# Patient Record
Sex: MALE | Race: WHITE | NOT HISPANIC OR LATINO | Employment: FULL TIME | URBAN - METROPOLITAN AREA
[De-identification: names, ages, dates, MRNs, and addresses within clinical notes are randomized per-mention and may not be internally consistent; named-entity substitution may affect disease eponyms.]

---

## 2018-06-18 RX ORDER — ALBUTEROL SULFATE 90 UG/1
AEROSOL, METERED RESPIRATORY (INHALATION)
Qty: 9 G | Refills: 0 | Status: SHIPPED | OUTPATIENT
Start: 2018-06-18 | End: 2019-03-14 | Stop reason: SDUPTHER

## 2019-03-18 RX ORDER — ALBUTEROL SULFATE 90 UG/1
AEROSOL, METERED RESPIRATORY (INHALATION)
Qty: 9 G | Refills: 0 | Status: SHIPPED | OUTPATIENT
Start: 2019-03-18 | End: 2019-04-10 | Stop reason: SDUPTHER

## 2019-04-10 DIAGNOSIS — J45.909 ASTHMA, UNSPECIFIED ASTHMA SEVERITY, UNSPECIFIED WHETHER COMPLICATED, UNSPECIFIED WHETHER PERSISTENT: Primary | ICD-10-CM

## 2019-04-10 RX ORDER — ALBUTEROL SULFATE 90 UG/1
1 AEROSOL, METERED RESPIRATORY (INHALATION) EVERY 6 HOURS PRN
Qty: 9 G | Refills: 0 | Status: SHIPPED | OUTPATIENT
Start: 2019-04-10 | End: 2021-06-01

## 2019-10-09 ENCOUNTER — OFFICE VISIT (OUTPATIENT)
Dept: ALLERGY | Facility: CLINIC | Age: 59
End: 2019-10-09
Payer: COMMERCIAL

## 2019-10-09 VITALS
HEIGHT: 72 IN | BODY MASS INDEX: 30.75 KG/M2 | WEIGHT: 227.06 LBS | DIASTOLIC BLOOD PRESSURE: 88 MMHG | SYSTOLIC BLOOD PRESSURE: 136 MMHG

## 2019-10-09 DIAGNOSIS — R05.9 COUGH: Primary | ICD-10-CM

## 2019-10-09 PROCEDURE — 99999 PR PBB SHADOW E&M-EST. PATIENT-LVL III: ICD-10-PCS | Mod: PBBFAC,,, | Performed by: ALLERGY & IMMUNOLOGY

## 2019-10-09 PROCEDURE — 99204 OFFICE O/P NEW MOD 45 MIN: CPT | Mod: S$GLB,,, | Performed by: ALLERGY & IMMUNOLOGY

## 2019-10-09 PROCEDURE — 99204 PR OFFICE/OUTPT VISIT, NEW, LEVL IV, 45-59 MIN: ICD-10-PCS | Mod: S$GLB,,, | Performed by: ALLERGY & IMMUNOLOGY

## 2019-10-09 PROCEDURE — 3008F PR BODY MASS INDEX (BMI) DOCUMENTED: ICD-10-PCS | Mod: CPTII,S$GLB,, | Performed by: ALLERGY & IMMUNOLOGY

## 2019-10-09 PROCEDURE — 3008F BODY MASS INDEX DOCD: CPT | Mod: CPTII,S$GLB,, | Performed by: ALLERGY & IMMUNOLOGY

## 2019-10-09 PROCEDURE — 99999 PR PBB SHADOW E&M-EST. PATIENT-LVL III: CPT | Mod: PBBFAC,,, | Performed by: ALLERGY & IMMUNOLOGY

## 2019-10-09 RX ORDER — BUDESONIDE AND FORMOTEROL FUMARATE DIHYDRATE 80; 4.5 UG/1; UG/1
2 AEROSOL RESPIRATORY (INHALATION) 2 TIMES DAILY
Qty: 1 INHALER | Refills: 6 | Status: SHIPPED | OUTPATIENT
Start: 2019-10-09 | End: 2020-11-18

## 2019-10-09 RX ORDER — ALBUTEROL SULFATE 90 UG/1
AEROSOL, METERED RESPIRATORY (INHALATION)
COMMUNITY
Start: 2019-03-13 | End: 2021-05-11

## 2019-10-09 RX ORDER — CETIRIZINE HYDROCHLORIDE 10 MG/1
10 TABLET ORAL DAILY
COMMUNITY
End: 2023-05-24

## 2019-10-09 NOTE — PROGRESS NOTES
Subjective:       Patient ID: Jerrell Bay IV is a 59 y.o. male.    Chief Complaint:  Nasal Congestion; Cough; and Wheezing (at night)      HPI  Pt w hx seasonal allergic rhinitis, usu worse in fall and spring, presents w main concern of chronic nocturnal cough over last approx 6 months.  Almost every night he wakes from sleep between 3 and 4 am w cough, shortness of breath. Symptoms are relieved with albuterol use. If he doesn't use albuterol, he finds it hard to go back to sleep. During this time has never had daytime wheeze. Denies wheeze w exertion, exercise. He exercises routinely. Doesn't appreciate any decrease in exercise tolerance. Hasn't been limiting his activity b/c of cough.  Denies overt GERD sx's    He recalls distant hx of asthma during college years. Recalls that he was allergy tested but not certain of results.  Recall he was on likely daily ICS for some time around then, but not since. Until the last 6 months he reports it had been several years since he needed albuterol. Denies any need for systemic steroids for wheeze over last several years.    Reports that rhinitis sx's--usu sneezing, cough, nasal congestion--are generally well-controlled w daily zyrtec during spring and fall, as well as prn flonase.    Environmental History: Pets in the home: dogs (2).  [place]: hardwood floors  Tobacco Smoke in Home: no    Past Medical History:   Diagnosis Date    Asthma        Family History   Problem Relation Age of Onset    Allergies Father          Review of Systems   Constitutional: Negative for activity change, fatigue and fever.   HENT: Negative for congestion, postnasal drip, rhinorrhea, sinus pressure and sneezing.    Eyes: Negative for discharge, redness and itching.   Respiratory: Negative for cough, shortness of breath and wheezing.    Cardiovascular: Negative for chest pain.   Gastrointestinal: Negative for constipation, diarrhea, nausea and vomiting.   Genitourinary: Negative for  difficulty urinating.   Musculoskeletal: Negative for joint swelling and myalgias.   Skin: Negative for rash.   Neurological: Negative for headaches.   Hematological: Does not bruise/bleed easily.   Psychiatric/Behavioral: Negative for behavioral problems and sleep disturbance.        Objective:   Physical Exam   Constitutional: He is oriented to person, place, and time. He appears well-developed and well-nourished. No distress.   HENT:   Head: Normocephalic and atraumatic.   Right Ear: Tympanic membrane and external ear normal.   Left Ear: Tympanic membrane and external ear normal.   Nose: Nose normal.   Mouth/Throat: Oropharynx is clear and moist. No oropharyngeal exudate.   Eyes: Conjunctivae are normal. Right eye exhibits no discharge. Left eye exhibits no discharge.   Neck: Normal range of motion. Neck supple. No thyromegaly present.   Cardiovascular: Normal rate and regular rhythm.   Pulmonary/Chest: Effort normal and breath sounds normal. No respiratory distress. He has no wheezes.   Abdominal: Soft. Bowel sounds are normal. He exhibits no distension. There is no tenderness.   Musculoskeletal: Normal range of motion. He exhibits no edema.   Lymphadenopathy:     He has no cervical adenopathy.   Neurological: He is alert and oriented to person, place, and time. He exhibits normal muscle tone.   Skin: Skin is warm. No rash noted. He is not diaphoretic. No erythema.   Psychiatric: He has a normal mood and affect. His behavior is normal. Judgment and thought content normal.         Assessment:       1. Cough     Consider asthma     Plan:       Jerrell was seen today for nasal congestion, cough and wheezing.    Diagnoses and all orders for this visit:    Cough    Other orders  -     budesonide-formoterol 80-4.5 mcg (SYMBICORT) 80-4.5 mcg/actuation HFAA; Inhale 2 puffs into the lungs 2 (two) times daily. Rinse mouth after use    albuterol prn    discussed poss spirometry, allergy testing. Will reconsider if no  improvement in 3-4 weeks    If responds well, consider wean to daily ICS in ~2 months.

## 2021-04-16 ENCOUNTER — PATIENT MESSAGE (OUTPATIENT)
Dept: RESEARCH | Facility: HOSPITAL | Age: 61
End: 2021-04-16

## 2021-05-11 ENCOUNTER — TELEPHONE (OUTPATIENT)
Dept: UROLOGY | Facility: CLINIC | Age: 61
End: 2021-05-11

## 2021-05-11 ENCOUNTER — OFFICE VISIT (OUTPATIENT)
Dept: UROLOGY | Facility: CLINIC | Age: 61
End: 2021-05-11
Payer: COMMERCIAL

## 2021-05-11 VITALS
HEIGHT: 72 IN | BODY MASS INDEX: 30.75 KG/M2 | WEIGHT: 227.06 LBS | DIASTOLIC BLOOD PRESSURE: 98 MMHG | SYSTOLIC BLOOD PRESSURE: 189 MMHG | HEART RATE: 69 BPM

## 2021-05-11 DIAGNOSIS — N40.0 ENLARGED PROSTATE: Primary | ICD-10-CM

## 2021-05-11 DIAGNOSIS — R97.20 ELEVATED PSA: ICD-10-CM

## 2021-05-11 PROCEDURE — 1126F AMNT PAIN NOTED NONE PRSNT: CPT | Mod: S$GLB,,, | Performed by: UROLOGY

## 2021-05-11 PROCEDURE — 99204 OFFICE O/P NEW MOD 45 MIN: CPT | Mod: S$GLB,,, | Performed by: UROLOGY

## 2021-05-11 PROCEDURE — 99204 PR OFFICE/OUTPT VISIT, NEW, LEVL IV, 45-59 MIN: ICD-10-PCS | Mod: S$GLB,,, | Performed by: UROLOGY

## 2021-05-11 PROCEDURE — 3008F BODY MASS INDEX DOCD: CPT | Mod: CPTII,S$GLB,, | Performed by: UROLOGY

## 2021-05-11 PROCEDURE — 1126F PR PAIN SEVERITY QUANTIFIED, NO PAIN PRESENT: ICD-10-PCS | Mod: S$GLB,,, | Performed by: UROLOGY

## 2021-05-11 PROCEDURE — 87086 URINE CULTURE/COLONY COUNT: CPT | Performed by: UROLOGY

## 2021-05-11 PROCEDURE — 3008F PR BODY MASS INDEX (BMI) DOCUMENTED: ICD-10-PCS | Mod: CPTII,S$GLB,, | Performed by: UROLOGY

## 2021-05-11 RX ORDER — MELOXICAM 15 MG/1
TABLET ORAL
COMMUNITY
End: 2023-05-24

## 2021-05-11 RX ORDER — SULFAMETHOXAZOLE AND TRIMETHOPRIM 400; 80 MG/1; MG/1
1 TABLET ORAL 2 TIMES DAILY
Qty: 56 TABLET | Refills: 0 | Status: SHIPPED | OUTPATIENT
Start: 2021-05-11 | End: 2021-06-08

## 2021-05-11 RX ORDER — IRBESARTAN 150 MG/1
TABLET ORAL
COMMUNITY
Start: 2021-03-31 | End: 2021-06-01

## 2021-05-12 LAB — BACTERIA UR CULT: NO GROWTH

## 2021-06-01 ENCOUNTER — OFFICE VISIT (OUTPATIENT)
Dept: INTERNAL MEDICINE | Facility: CLINIC | Age: 61
End: 2021-06-01
Attending: INTERNAL MEDICINE
Payer: COMMERCIAL

## 2021-06-01 VITALS
SYSTOLIC BLOOD PRESSURE: 158 MMHG | BODY MASS INDEX: 29.26 KG/M2 | HEART RATE: 72 BPM | OXYGEN SATURATION: 98 % | HEIGHT: 72 IN | WEIGHT: 216 LBS | DIASTOLIC BLOOD PRESSURE: 86 MMHG

## 2021-06-01 DIAGNOSIS — E78.00 HIGH CHOLESTEROL: ICD-10-CM

## 2021-06-01 DIAGNOSIS — J45.20 MILD INTERMITTENT ASTHMA WITHOUT COMPLICATION: ICD-10-CM

## 2021-06-01 DIAGNOSIS — I10 ESSENTIAL HYPERTENSION: ICD-10-CM

## 2021-06-01 DIAGNOSIS — R97.20 ELEVATED PSA: Primary | ICD-10-CM

## 2021-06-01 DIAGNOSIS — M19.011 PRIMARY OSTEOARTHRITIS OF RIGHT SHOULDER: ICD-10-CM

## 2021-06-01 PROCEDURE — 3008F BODY MASS INDEX DOCD: CPT | Mod: CPTII,S$GLB,, | Performed by: INTERNAL MEDICINE

## 2021-06-01 PROCEDURE — 3077F SYST BP >= 140 MM HG: CPT | Mod: CPTII,S$GLB,, | Performed by: INTERNAL MEDICINE

## 2021-06-01 PROCEDURE — 3008F PR BODY MASS INDEX (BMI) DOCUMENTED: ICD-10-PCS | Mod: CPTII,S$GLB,, | Performed by: INTERNAL MEDICINE

## 2021-06-01 PROCEDURE — 3079F PR MOST RECENT DIASTOLIC BLOOD PRESSURE 80-89 MM HG: ICD-10-PCS | Mod: CPTII,S$GLB,, | Performed by: INTERNAL MEDICINE

## 2021-06-01 PROCEDURE — 99386 PR PREVENTIVE VISIT,NEW,40-64: ICD-10-PCS | Mod: S$GLB,,, | Performed by: INTERNAL MEDICINE

## 2021-06-01 PROCEDURE — 3079F DIAST BP 80-89 MM HG: CPT | Mod: CPTII,S$GLB,, | Performed by: INTERNAL MEDICINE

## 2021-06-01 PROCEDURE — 3077F PR MOST RECENT SYSTOLIC BLOOD PRESSURE >= 140 MM HG: ICD-10-PCS | Mod: CPTII,S$GLB,, | Performed by: INTERNAL MEDICINE

## 2021-06-01 PROCEDURE — 99386 PREV VISIT NEW AGE 40-64: CPT | Mod: S$GLB,,, | Performed by: INTERNAL MEDICINE

## 2021-06-01 RX ORDER — IRBESARTAN 300 MG/1
300 TABLET ORAL NIGHTLY
Qty: 90 TABLET | Refills: 3 | Status: SHIPPED | OUTPATIENT
Start: 2021-06-01 | End: 2022-03-14

## 2021-06-03 PROBLEM — J45.20 MILD INTERMITTENT ASTHMA WITHOUT COMPLICATION: Status: ACTIVE | Noted: 2021-06-03

## 2021-06-03 PROBLEM — M19.011 DJD OF RIGHT SHOULDER: Status: ACTIVE | Noted: 2021-06-03

## 2021-06-03 PROBLEM — E78.00 HIGH CHOLESTEROL: Status: ACTIVE | Noted: 2021-06-03

## 2021-06-14 ENCOUNTER — LAB VISIT (OUTPATIENT)
Dept: LAB | Facility: HOSPITAL | Age: 61
End: 2021-06-14
Attending: UROLOGY
Payer: COMMERCIAL

## 2021-06-14 DIAGNOSIS — N40.0 ENLARGED PROSTATE: ICD-10-CM

## 2021-06-14 DIAGNOSIS — R97.20 ELEVATED PSA: ICD-10-CM

## 2021-06-14 PROCEDURE — 84153 ASSAY OF PSA TOTAL: CPT | Performed by: UROLOGY

## 2021-06-14 PROCEDURE — 36415 COLL VENOUS BLD VENIPUNCTURE: CPT | Mod: PO | Performed by: UROLOGY

## 2021-06-15 LAB — COMPLEXED PSA SERPL-MCNC: 6.1 NG/ML (ref 0–4)

## 2021-06-18 ENCOUNTER — OFFICE VISIT (OUTPATIENT)
Dept: UROLOGY | Facility: CLINIC | Age: 61
End: 2021-06-18
Payer: COMMERCIAL

## 2021-06-18 VITALS
HEART RATE: 73 BPM | SYSTOLIC BLOOD PRESSURE: 175 MMHG | WEIGHT: 216 LBS | HEIGHT: 72 IN | DIASTOLIC BLOOD PRESSURE: 89 MMHG | BODY MASS INDEX: 29.26 KG/M2

## 2021-06-18 DIAGNOSIS — R97.20 ELEVATED PSA: ICD-10-CM

## 2021-06-18 DIAGNOSIS — N40.0 ENLARGED PROSTATE: Primary | ICD-10-CM

## 2021-06-18 PROCEDURE — 1126F AMNT PAIN NOTED NONE PRSNT: CPT | Mod: S$GLB,,, | Performed by: UROLOGY

## 2021-06-18 PROCEDURE — 1126F PR PAIN SEVERITY QUANTIFIED, NO PAIN PRESENT: ICD-10-PCS | Mod: S$GLB,,, | Performed by: UROLOGY

## 2021-06-18 PROCEDURE — 99214 OFFICE O/P EST MOD 30 MIN: CPT | Mod: S$GLB,,, | Performed by: UROLOGY

## 2021-06-18 PROCEDURE — 3008F BODY MASS INDEX DOCD: CPT | Mod: CPTII,S$GLB,, | Performed by: UROLOGY

## 2021-06-18 PROCEDURE — 3008F PR BODY MASS INDEX (BMI) DOCUMENTED: ICD-10-PCS | Mod: CPTII,S$GLB,, | Performed by: UROLOGY

## 2021-06-18 PROCEDURE — 99214 PR OFFICE/OUTPT VISIT, EST, LEVL IV, 30-39 MIN: ICD-10-PCS | Mod: S$GLB,,, | Performed by: UROLOGY

## 2021-06-18 RX ORDER — CIPROFLOXACIN 500 MG/1
500 TABLET ORAL 2 TIMES DAILY
Qty: 4 TABLET | Refills: 0 | Status: SHIPPED | OUTPATIENT
Start: 2021-06-18 | End: 2021-06-20

## 2021-06-18 RX ORDER — ALPRAZOLAM 0.5 MG/1
0.5 TABLET ORAL ONCE AS NEEDED
Qty: 12 TABLET | Refills: 0 | Status: SHIPPED | OUTPATIENT
Start: 2021-06-18 | End: 2022-11-15

## 2021-06-21 ENCOUNTER — TELEPHONE (OUTPATIENT)
Dept: UROLOGY | Facility: CLINIC | Age: 61
End: 2021-06-21

## 2021-06-28 DIAGNOSIS — I10 ESSENTIAL HYPERTENSION: Primary | ICD-10-CM

## 2021-06-28 DIAGNOSIS — E78.00 HIGH CHOLESTEROL: ICD-10-CM

## 2021-06-28 RX ORDER — TRIAMTERENE AND HYDROCHLOROTHIAZIDE 75; 50 MG/1; MG/1
1 TABLET ORAL DAILY
Qty: 90 TABLET | Refills: 1 | Status: SHIPPED | OUTPATIENT
Start: 2021-06-28 | End: 2021-09-13

## 2021-06-28 RX ORDER — ROSUVASTATIN CALCIUM 10 MG/1
10 TABLET, COATED ORAL DAILY
Qty: 90 TABLET | Refills: 1 | Status: SHIPPED | OUTPATIENT
Start: 2021-06-28 | End: 2023-05-24 | Stop reason: SDUPTHER

## 2021-07-05 ENCOUNTER — NURSE TRIAGE (OUTPATIENT)
Dept: ADMINISTRATIVE | Facility: CLINIC | Age: 61
End: 2021-07-05

## 2021-07-06 ENCOUNTER — HOSPITAL ENCOUNTER (OUTPATIENT)
Dept: RADIOLOGY | Facility: HOSPITAL | Age: 61
Discharge: HOME | End: 2021-07-06
Attending: UROLOGY
Payer: COMMERCIAL

## 2021-07-06 DIAGNOSIS — N40.0 ENLARGED PROSTATE: ICD-10-CM

## 2021-07-06 DIAGNOSIS — R97.20 ELEVATED PSA: ICD-10-CM

## 2021-07-06 DIAGNOSIS — R97.20 ELEVATED PSA: Primary | ICD-10-CM

## 2021-07-06 PROCEDURE — 72197 MRI PELVIS W/O & W/DYE: CPT | Mod: TC

## 2021-07-06 PROCEDURE — 25500020 PHARM REV CODE 255: Performed by: UROLOGY

## 2021-07-06 PROCEDURE — 72197 MRI PELVIS W/O & W/DYE: CPT | Mod: 26,,, | Performed by: RADIOLOGY

## 2021-07-06 PROCEDURE — A9585 GADOBUTROL INJECTION: HCPCS | Performed by: UROLOGY

## 2021-07-06 PROCEDURE — 72197 MRI PROSTATE W W/O CONTRAST: ICD-10-PCS | Mod: 26,,, | Performed by: RADIOLOGY

## 2021-07-06 RX ORDER — GADOBUTROL 604.72 MG/ML
10 INJECTION INTRAVENOUS
Status: COMPLETED | OUTPATIENT
Start: 2021-07-06 | End: 2021-07-06

## 2021-07-06 RX ADMIN — GADOBUTROL 10 ML: 604.72 INJECTION INTRAVENOUS at 03:07

## 2021-07-07 ENCOUNTER — PATIENT MESSAGE (OUTPATIENT)
Dept: UROLOGY | Facility: CLINIC | Age: 61
End: 2021-07-07

## 2021-07-07 ENCOUNTER — TELEPHONE (OUTPATIENT)
Dept: UROLOGY | Facility: CLINIC | Age: 61
End: 2021-07-07

## 2021-07-08 ENCOUNTER — TELEPHONE (OUTPATIENT)
Dept: UROLOGY | Facility: CLINIC | Age: 61
End: 2021-07-08

## 2021-07-08 DIAGNOSIS — R97.20 ELEVATED PSA: Primary | ICD-10-CM

## 2021-07-08 DIAGNOSIS — N40.0 ENLARGED PROSTATE: ICD-10-CM

## 2021-08-03 ENCOUNTER — PATIENT MESSAGE (OUTPATIENT)
Dept: INTERNAL MEDICINE | Facility: CLINIC | Age: 61
End: 2021-08-03

## 2021-09-18 ENCOUNTER — TELEPHONE (OUTPATIENT)
Dept: ADMINISTRATIVE | Facility: HOSPITAL | Age: 61
End: 2021-09-18

## 2021-10-05 DIAGNOSIS — J45.909 ASTHMA, UNSPECIFIED ASTHMA SEVERITY, UNSPECIFIED WHETHER COMPLICATED, UNSPECIFIED WHETHER PERSISTENT: Primary | ICD-10-CM

## 2021-10-05 RX ORDER — BUDESONIDE AND FORMOTEROL FUMARATE DIHYDRATE 80; 4.5 UG/1; UG/1
2 AEROSOL RESPIRATORY (INHALATION) 2 TIMES DAILY
Qty: 10.2 G | Refills: 2 | Status: CANCELLED | OUTPATIENT
Start: 2021-10-05 | End: 2022-10-05

## 2021-11-07 ENCOUNTER — HOSPITAL ENCOUNTER (EMERGENCY)
Facility: HOSPITAL | Age: 61
Discharge: HOME | End: 2021-11-07
Attending: EMERGENCY MEDICINE
Payer: COMMERCIAL

## 2021-11-07 VITALS
OXYGEN SATURATION: 98 % | HEART RATE: 94 BPM | TEMPERATURE: 98 F | RESPIRATION RATE: 20 BRPM | SYSTOLIC BLOOD PRESSURE: 167 MMHG | WEIGHT: 215 LBS | HEIGHT: 72 IN | BODY MASS INDEX: 29.12 KG/M2 | DIASTOLIC BLOOD PRESSURE: 106 MMHG
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
## 2021-11-07 DIAGNOSIS — S01.01XA SCALP LACERATION, INITIAL ENCOUNTER: ICD-10-CM

## 2021-11-07 DIAGNOSIS — S09.90XA INJURY OF HEAD, INITIAL ENCOUNTER: Primary | ICD-10-CM

## 2021-11-07 DIAGNOSIS — T14.8XXA SKIN ABRASION: ICD-10-CM

## 2021-11-07 DIAGNOSIS — S80.12XA TRAUMATIC HEMATOMA OF LEFT LOWER LEG, INITIAL ENCOUNTER: ICD-10-CM

## 2021-11-07 PROCEDURE — 99284 EMERGENCY DEPT VISIT MOD MDM: CPT | Mod: 25

## 2021-11-07 PROCEDURE — 99284 PR EMERGENCY DEPT VISIT,LEVEL IV: ICD-10-PCS | Mod: ,,, | Performed by: PHYSICIAN ASSISTANT

## 2021-11-07 PROCEDURE — 90471 IMMUNIZATION ADMIN: CPT | Performed by: PHYSICIAN ASSISTANT

## 2021-11-07 PROCEDURE — 63600175 PHARM REV CODE 636 W HCPCS: Performed by: PHYSICIAN ASSISTANT

## 2021-11-07 PROCEDURE — 99284 EMERGENCY DEPT VISIT MOD MDM: CPT | Mod: ,,, | Performed by: PHYSICIAN ASSISTANT

## 2021-11-07 PROCEDURE — 25000003 PHARM REV CODE 250: Performed by: PHYSICIAN ASSISTANT

## 2021-11-07 PROCEDURE — 90715 TDAP VACCINE 7 YRS/> IM: CPT | Performed by: PHYSICIAN ASSISTANT

## 2021-11-07 RX ORDER — BACITRACIN ZINC 500 UNIT/G
OINTMENT (GRAM) TOPICAL 2 TIMES DAILY
Qty: 30 G | Refills: 0 | Status: SHIPPED | OUTPATIENT
Start: 2021-11-07 | End: 2022-04-05

## 2021-11-07 RX ORDER — SULFAMETHOXAZOLE AND TRIMETHOPRIM 400; 80 MG/1; MG/1
1 TABLET ORAL 2 TIMES DAILY
Qty: 10 TABLET | Refills: 0 | Status: SHIPPED | OUTPATIENT
Start: 2021-11-07 | End: 2021-11-12

## 2021-11-07 RX ORDER — LIDOCAINE HYDROCHLORIDE AND EPINEPHRINE 10; 10 MG/ML; UG/ML
20 INJECTION, SOLUTION INFILTRATION; PERINEURAL ONCE
Status: COMPLETED | OUTPATIENT
Start: 2021-11-07 | End: 2021-11-07

## 2021-11-07 RX ORDER — TRIAMTERENE AND HYDROCHLOROTHIAZIDE 37.5; 25 MG/1; MG/1
2 CAPSULE ORAL
Status: DISCONTINUED | OUTPATIENT
Start: 2021-11-07 | End: 2021-11-07 | Stop reason: HOSPADM

## 2021-11-07 RX ORDER — METHOCARBAMOL 750 MG/1
750 TABLET, FILM COATED ORAL 3 TIMES DAILY PRN
Qty: 20 TABLET | Refills: 0 | Status: SHIPPED | OUTPATIENT
Start: 2021-11-07 | End: 2022-04-05

## 2021-11-07 RX ORDER — NAPROXEN 500 MG/1
500 TABLET ORAL 2 TIMES DAILY PRN
Qty: 10 TABLET | Refills: 0 | Status: SHIPPED | OUTPATIENT
Start: 2021-11-07 | End: 2022-04-05

## 2021-11-07 RX ADMIN — TETANUS TOXOID, REDUCED DIPHTHERIA TOXOID AND ACELLULAR PERTUSSIS VACCINE, ADSORBED 0.5 ML: 5; 2.5; 8; 8; 2.5 SUSPENSION INTRAMUSCULAR at 11:11

## 2021-11-07 RX ADMIN — LIDOCAINE HYDROCHLORIDE AND EPINEPHRINE 20 ML: 10; 10 INJECTION, SOLUTION INFILTRATION; PERINEURAL at 11:11

## 2021-11-08 ENCOUNTER — PATIENT MESSAGE (OUTPATIENT)
Dept: INTERNAL MEDICINE | Facility: CLINIC | Age: 61
End: 2021-11-08
Payer: COMMERCIAL

## 2021-11-10 ENCOUNTER — PATIENT MESSAGE (OUTPATIENT)
Dept: INTERNAL MEDICINE | Facility: CLINIC | Age: 61
End: 2021-11-10
Payer: COMMERCIAL

## 2021-11-10 DIAGNOSIS — I51.7 CARDIOMEGALY: ICD-10-CM

## 2021-11-10 DIAGNOSIS — I10 PRIMARY HYPERTENSION: Primary | ICD-10-CM

## 2021-11-10 DIAGNOSIS — R09.89 CAROTID BRUIT, UNSPECIFIED LATERALITY: ICD-10-CM

## 2021-12-02 ENCOUNTER — CLINICAL SUPPORT (OUTPATIENT)
Dept: INTERNAL MEDICINE | Facility: CLINIC | Age: 61
End: 2021-12-02
Attending: INTERNAL MEDICINE
Payer: COMMERCIAL

## 2021-12-02 DIAGNOSIS — R00.2 PALPITATIONS: ICD-10-CM

## 2021-12-02 DIAGNOSIS — I10 PRIMARY HYPERTENSION: ICD-10-CM

## 2021-12-02 DIAGNOSIS — R09.89 CAROTID BRUIT, UNSPECIFIED LATERALITY: Primary | ICD-10-CM

## 2021-12-02 DIAGNOSIS — I51.7 CARDIOMEGALY: ICD-10-CM

## 2021-12-02 DIAGNOSIS — R09.89 CAROTID BRUIT, UNSPECIFIED LATERALITY: ICD-10-CM

## 2021-12-02 PROCEDURE — 93306 TTE W/DOPPLER COMPLETE: CPT | Mod: S$GLB,,, | Performed by: INTERNAL MEDICINE

## 2021-12-02 PROCEDURE — 93306 PR ECHO HEART XTHORACIC,COMPLETE W DOPPLER: ICD-10-PCS | Mod: S$GLB,,, | Performed by: INTERNAL MEDICINE

## 2021-12-02 PROCEDURE — 93000 PR ELECTROCARDIOGRAM, COMPLETE: ICD-10-PCS | Mod: S$GLB,,, | Performed by: INTERNAL MEDICINE

## 2021-12-02 PROCEDURE — 93880 EXTRACRANIAL BILAT STUDY: CPT | Mod: S$GLB,,, | Performed by: INTERNAL MEDICINE

## 2021-12-02 PROCEDURE — 93880 PR DUPLEX SCAN EXTRACRANIAL,BILAT: ICD-10-PCS | Mod: S$GLB,,, | Performed by: INTERNAL MEDICINE

## 2021-12-02 PROCEDURE — 93000 ELECTROCARDIOGRAM COMPLETE: CPT | Mod: S$GLB,,, | Performed by: INTERNAL MEDICINE

## 2021-12-03 PROBLEM — I51.89 GRADE I DIASTOLIC DYSFUNCTION: Status: ACTIVE | Noted: 2021-12-03

## 2021-12-03 PROBLEM — S01.01XA SCALP LACERATION: Status: RESOLVED | Noted: 2021-11-07 | Resolved: 2021-12-03

## 2021-12-03 PROBLEM — I34.0 NONRHEUMATIC MITRAL VALVE REGURGITATION: Status: ACTIVE | Noted: 2021-12-03

## 2021-12-08 ENCOUNTER — PATIENT MESSAGE (OUTPATIENT)
Dept: INTERNAL MEDICINE | Facility: CLINIC | Age: 61
End: 2021-12-08
Payer: COMMERCIAL

## 2022-01-14 ENCOUNTER — LAB VISIT (OUTPATIENT)
Dept: LAB | Facility: HOSPITAL | Age: 62
End: 2022-01-14
Attending: UROLOGY
Payer: COMMERCIAL

## 2022-01-14 DIAGNOSIS — N40.0 ENLARGED PROSTATE: ICD-10-CM

## 2022-01-14 DIAGNOSIS — R97.20 ELEVATED PSA: ICD-10-CM

## 2022-01-14 LAB — COMPLEXED PSA SERPL-MCNC: 6.5 NG/ML (ref 0–4)

## 2022-01-14 PROCEDURE — 84153 ASSAY OF PSA TOTAL: CPT | Performed by: UROLOGY

## 2022-01-14 PROCEDURE — 36415 COLL VENOUS BLD VENIPUNCTURE: CPT | Mod: PO | Performed by: UROLOGY

## 2022-01-15 NOTE — PROGRESS NOTES
Pt was instructed to keep appointment to discuss results or to make an appointment if one is not already scheduled

## 2022-01-18 ENCOUNTER — OFFICE VISIT (OUTPATIENT)
Dept: UROLOGY | Facility: CLINIC | Age: 62
End: 2022-01-18
Payer: COMMERCIAL

## 2022-01-18 ENCOUNTER — TELEPHONE (OUTPATIENT)
Dept: UROLOGY | Facility: CLINIC | Age: 62
End: 2022-01-18

## 2022-01-18 VITALS
SYSTOLIC BLOOD PRESSURE: 155 MMHG | DIASTOLIC BLOOD PRESSURE: 88 MMHG | HEIGHT: 72 IN | WEIGHT: 222.88 LBS | HEART RATE: 68 BPM | BODY MASS INDEX: 30.19 KG/M2

## 2022-01-18 DIAGNOSIS — N52.01 ERECTILE DYSFUNCTION DUE TO ARTERIAL INSUFFICIENCY: ICD-10-CM

## 2022-01-18 DIAGNOSIS — R97.20 ELEVATED PSA: Primary | ICD-10-CM

## 2022-01-18 LAB
BILIRUB SERPL-MCNC: NEGATIVE MG/DL
BLOOD URINE, POC: NEGATIVE
CLARITY, POC UA: CLEAR
COLOR, POC UA: YELLOW
GLUCOSE UR QL STRIP: NEGATIVE
KETONES UR QL STRIP: NEGATIVE
LEUKOCYTE ESTERASE URINE, POC: NEGATIVE
NITRITE, POC UA: NEGATIVE
PH, POC UA: 7
PROTEIN, POC: POSITIVE
SPECIFIC GRAVITY, POC UA: 1
UROBILINOGEN, POC UA: NEGATIVE

## 2022-01-18 PROCEDURE — 1160F RVW MEDS BY RX/DR IN RCRD: CPT | Mod: CPTII,S$GLB,, | Performed by: NURSE PRACTITIONER

## 2022-01-18 PROCEDURE — 3079F PR MOST RECENT DIASTOLIC BLOOD PRESSURE 80-89 MM HG: ICD-10-PCS | Mod: CPTII,S$GLB,, | Performed by: NURSE PRACTITIONER

## 2022-01-18 PROCEDURE — 1159F MED LIST DOCD IN RCRD: CPT | Mod: CPTII,S$GLB,, | Performed by: NURSE PRACTITIONER

## 2022-01-18 PROCEDURE — 1160F PR REVIEW ALL MEDS BY PRESCRIBER/CLIN PHARMACIST DOCUMENTED: ICD-10-PCS | Mod: CPTII,S$GLB,, | Performed by: NURSE PRACTITIONER

## 2022-01-18 PROCEDURE — 3008F PR BODY MASS INDEX (BMI) DOCUMENTED: ICD-10-PCS | Mod: CPTII,S$GLB,, | Performed by: NURSE PRACTITIONER

## 2022-01-18 PROCEDURE — 3008F BODY MASS INDEX DOCD: CPT | Mod: CPTII,S$GLB,, | Performed by: NURSE PRACTITIONER

## 2022-01-18 PROCEDURE — 3077F PR MOST RECENT SYSTOLIC BLOOD PRESSURE >= 140 MM HG: ICD-10-PCS | Mod: CPTII,S$GLB,, | Performed by: NURSE PRACTITIONER

## 2022-01-18 PROCEDURE — 3079F DIAST BP 80-89 MM HG: CPT | Mod: CPTII,S$GLB,, | Performed by: NURSE PRACTITIONER

## 2022-01-18 PROCEDURE — 1159F PR MEDICATION LIST DOCUMENTED IN MEDICAL RECORD: ICD-10-PCS | Mod: CPTII,S$GLB,, | Performed by: NURSE PRACTITIONER

## 2022-01-18 PROCEDURE — 99213 OFFICE O/P EST LOW 20 MIN: CPT | Mod: S$GLB,,, | Performed by: NURSE PRACTITIONER

## 2022-01-18 PROCEDURE — 81002 POCT URINE DIPSTICK WITHOUT MICROSCOPE: ICD-10-PCS | Mod: S$GLB,,, | Performed by: NURSE PRACTITIONER

## 2022-01-18 PROCEDURE — 81002 URINALYSIS NONAUTO W/O SCOPE: CPT | Mod: S$GLB,,, | Performed by: NURSE PRACTITIONER

## 2022-01-18 PROCEDURE — 3077F SYST BP >= 140 MM HG: CPT | Mod: CPTII,S$GLB,, | Performed by: NURSE PRACTITIONER

## 2022-01-18 PROCEDURE — 99213 PR OFFICE/OUTPT VISIT, EST, LEVL III, 20-29 MIN: ICD-10-PCS | Mod: S$GLB,,, | Performed by: NURSE PRACTITIONER

## 2022-01-18 RX ORDER — SILDENAFIL CITRATE 20 MG/1
20 TABLET ORAL DAILY PRN
Qty: 30 TABLET | Refills: 11 | Status: SHIPPED | OUTPATIENT
Start: 2022-01-18

## 2022-01-18 NOTE — PROGRESS NOTES
"Subjective:      Jerrell Bay IV is a 61 y.o. male who returns today with repeat PSA. He is an established patient of Dr. Carrera and is new to me today.    The patient has a history of elevated PSA.    Component PSA Diagnostic   Latest Ref Rng & Units 0.00 - 4.00 ng/mL   1/14/2022 6.5 (H)   6/14/2021 6.1 (H)     Prostate MRI in July 2021 demonstrated "PIRADS 2: BPH.  No focal lesions suspicious for clinically significant cancer.."    Denies changes in urination. Stream is decent. Frequency and urgency with caffeine intake. Denies a family history of prostate cancer.     ED responds well with Viagra 20 mg. He does report a HA with the viagra. Not on nitro.     The following portions of the patient's history were reviewed and updated as appropriate: allergies, current medications, past family history, past medical history, past social history, past surgical history and problem list.    Review of Systems  Constitutional: no fever or chills  ENT: no nasal congestion or sore throat  Respiratory: no cough or shortness of breath  Cardiovascular: no chest pain or palpitations  Gastrointestinal: no nausea or vomiting, tolerating diet  Genitourinary: as per HPI  Hematologic/Lymphatic: no easy bruising or lymphadenopathy  Musculoskeletal: no arthralgias or myalgias  Neurological: no seizures or tremors  Behavioral/Psych: no auditory or visual hallucinations     Objective:   Vitals:   Vitals:    01/18/22 1059   BP: (!) 155/88   Pulse: 68     Physical Exam   General: alert and oriented, no acute distress  Head: normocephalic, atraumatic  Neck: supple, normal ROM  Respiratory: Symmetric expansion, non-labored breathing  Cardiovascular: regular rate and rhythm  Abdomen: soft, non tender, non distended   Genitourinary: deferred  Skin: normal coloration and turgor, no rashes, no suspicious skin lesions noted  Neuro: alert and oriented x3, no gross deficits  Psych: normal judgment and insight, normal mood/affect and " "non-anxious    Lab Review   Urinalysis demonstrates negative for all components  No results found for: WBC, HGB, HCT, MCV, PLT  Lab Results   Component Value Date    CREATININE 1.0 06/18/2021    BUN 22 06/18/2021     No results found for: PSA  Imaging   (all images personally reviewed; agree with report below)  Prostate MRI (7/6/21)- "1. BPH.  No focal lesions suspicious for clinically significant cancer.  2. Several prominent mesorectal lymph nodes, nonspecific.  Recommend further evaluation with colonoscopy.  Overall Assessment:  PIRADS 2 (clinically significant cancer unlikely to be present)  Number of targets created for potential MR/US fusion biopsy  Peripheral zone: 0  Transition zone: 0"    Assessment:   Elevated PSA  ED due to arterial insufficiency     Plan:   Jerrell was seen today for follow-up.    Diagnoses and all orders for this visit:    Elevated PSA  -     POCT URINE DIPSTICK WITHOUT MICROSCOPE  -     Prostate Specific Antigen, Diagnostic; Future    Erectile dysfunction due to arterial insufficiency  -     sildenafil (REVATIO) 20 mg Tab; Take 1 tablet (20 mg total) by mouth daily as needed (ED).    Plan:  --Sildenafil PRN for ED  --Repeat PSA in 6 months!  "

## 2022-04-05 ENCOUNTER — LAB VISIT (OUTPATIENT)
Dept: LAB | Facility: OTHER | Age: 62
End: 2022-04-05
Attending: INTERNAL MEDICINE
Payer: COMMERCIAL

## 2022-04-05 ENCOUNTER — OFFICE VISIT (OUTPATIENT)
Dept: INTERNAL MEDICINE | Facility: CLINIC | Age: 62
End: 2022-04-05
Attending: INTERNAL MEDICINE
Payer: COMMERCIAL

## 2022-04-05 VITALS
HEART RATE: 71 BPM | WEIGHT: 218 LBS | DIASTOLIC BLOOD PRESSURE: 89 MMHG | HEIGHT: 72 IN | OXYGEN SATURATION: 97 % | SYSTOLIC BLOOD PRESSURE: 139 MMHG | BODY MASS INDEX: 29.53 KG/M2

## 2022-04-05 DIAGNOSIS — D50.8 OTHER IRON DEFICIENCY ANEMIA: ICD-10-CM

## 2022-04-05 DIAGNOSIS — E55.9 VITAMIN D DEFICIENCY: ICD-10-CM

## 2022-04-05 DIAGNOSIS — D51.0 PERNICIOUS ANEMIA: ICD-10-CM

## 2022-04-05 DIAGNOSIS — E03.9 HYPOTHYROIDISM, UNSPECIFIED TYPE: ICD-10-CM

## 2022-04-05 DIAGNOSIS — Z00.00 ROUTINE ADULT HEALTH MAINTENANCE: ICD-10-CM

## 2022-04-05 DIAGNOSIS — R79.89 OTHER SPECIFIED ABNORMAL FINDINGS OF BLOOD CHEMISTRY: ICD-10-CM

## 2022-04-05 DIAGNOSIS — E78.00 HIGH CHOLESTEROL: ICD-10-CM

## 2022-04-05 DIAGNOSIS — E78.9 DISORDER OF LIPID METABOLISM: ICD-10-CM

## 2022-04-05 DIAGNOSIS — Z12.5 SCREENING FOR PROSTATE CANCER: ICD-10-CM

## 2022-04-05 DIAGNOSIS — I10 ESSENTIAL HYPERTENSION: ICD-10-CM

## 2022-04-05 DIAGNOSIS — Z00.00 ROUTINE ADULT HEALTH MAINTENANCE: Primary | ICD-10-CM

## 2022-04-05 DIAGNOSIS — J45.20 MILD INTERMITTENT ASTHMA WITHOUT COMPLICATION: Primary | ICD-10-CM

## 2022-04-05 LAB
25(OH)D3+25(OH)D2 SERPL-MCNC: 57 NG/ML (ref 30–96)
ALBUMIN SERPL BCP-MCNC: 4.1 G/DL (ref 3.5–5.2)
ALP SERPL-CCNC: 62 U/L (ref 55–135)
ALT SERPL W/O P-5'-P-CCNC: 36 U/L (ref 10–44)
ANION GAP SERPL CALC-SCNC: 13 MMOL/L (ref 8–16)
AST SERPL-CCNC: 29 U/L (ref 10–40)
BASOPHILS # BLD AUTO: 0.06 K/UL (ref 0–0.2)
BASOPHILS NFR BLD: 0.8 % (ref 0–1.9)
BILIRUB SERPL-MCNC: 1.1 MG/DL (ref 0.1–1)
BUN SERPL-MCNC: 22 MG/DL (ref 8–23)
CALCIUM SERPL-MCNC: 10.2 MG/DL (ref 8.7–10.5)
CHLORIDE SERPL-SCNC: 99 MMOL/L (ref 95–110)
CHOLEST SERPL-MCNC: 226 MG/DL (ref 120–199)
CHOLEST/HDLC SERPL: 2.7 {RATIO} (ref 2–5)
CK SERPL-CCNC: 205 U/L (ref 20–200)
CO2 SERPL-SCNC: 29 MMOL/L (ref 23–29)
COMPLEXED PSA SERPL-MCNC: 5.7 NG/ML (ref 0–4)
CREAT SERPL-MCNC: 1 MG/DL (ref 0.5–1.4)
DIFFERENTIAL METHOD: ABNORMAL
EOSINOPHIL # BLD AUTO: 0.5 K/UL (ref 0–0.5)
EOSINOPHIL NFR BLD: 6.7 % (ref 0–8)
ERYTHROCYTE [DISTWIDTH] IN BLOOD BY AUTOMATED COUNT: 12.6 % (ref 11.5–14.5)
EST. GFR  (AFRICAN AMERICAN): >60 ML/MIN/1.73 M^2
EST. GFR  (NON AFRICAN AMERICAN): >60 ML/MIN/1.73 M^2
ESTIMATED AVG GLUCOSE: 108 MG/DL (ref 68–131)
GLUCOSE SERPL-MCNC: 108 MG/DL (ref 70–110)
HBA1C MFR BLD: 5.4 % (ref 4–5.6)
HCT VFR BLD AUTO: 46.8 % (ref 40–54)
HDLC SERPL-MCNC: 85 MG/DL (ref 40–75)
HDLC SERPL: 37.6 % (ref 20–50)
HGB BLD-MCNC: 15.8 G/DL (ref 14–18)
IMM GRANULOCYTES # BLD AUTO: 0.02 K/UL (ref 0–0.04)
IMM GRANULOCYTES NFR BLD AUTO: 0.3 % (ref 0–0.5)
LDLC SERPL CALC-MCNC: 116.8 MG/DL (ref 63–159)
LYMPHOCYTES # BLD AUTO: 1 K/UL (ref 1–4.8)
LYMPHOCYTES NFR BLD: 13.2 % (ref 18–48)
MCH RBC QN AUTO: 32.4 PG (ref 27–31)
MCHC RBC AUTO-ENTMCNC: 33.8 G/DL (ref 32–36)
MCV RBC AUTO: 96 FL (ref 82–98)
MONOCYTES # BLD AUTO: 1 K/UL (ref 0.3–1)
MONOCYTES NFR BLD: 12.4 % (ref 4–15)
NEUTROPHILS # BLD AUTO: 5.3 K/UL (ref 1.8–7.7)
NEUTROPHILS NFR BLD: 66.6 % (ref 38–73)
NONHDLC SERPL-MCNC: 141 MG/DL
NRBC BLD-RTO: 0 /100 WBC
PLATELET # BLD AUTO: 277 K/UL (ref 150–450)
PMV BLD AUTO: 9.5 FL (ref 9.2–12.9)
POTASSIUM SERPL-SCNC: 4 MMOL/L (ref 3.5–5.1)
PROT SERPL-MCNC: 7.5 G/DL (ref 6–8.4)
RBC # BLD AUTO: 4.88 M/UL (ref 4.6–6.2)
SODIUM SERPL-SCNC: 141 MMOL/L (ref 136–145)
TRIGL SERPL-MCNC: 121 MG/DL (ref 30–150)
TSH SERPL DL<=0.005 MIU/L-ACNC: 3.29 UIU/ML (ref 0.4–4)
VIT B12 SERPL-MCNC: 464 PG/ML (ref 210–950)
WBC # BLD AUTO: 7.9 K/UL (ref 3.9–12.7)

## 2022-04-05 PROCEDURE — 84443 ASSAY THYROID STIM HORMONE: CPT | Performed by: INTERNAL MEDICINE

## 2022-04-05 PROCEDURE — 83036 HEMOGLOBIN GLYCOSYLATED A1C: CPT | Performed by: INTERNAL MEDICINE

## 2022-04-05 PROCEDURE — 3075F PR MOST RECENT SYSTOLIC BLOOD PRESS GE 130-139MM HG: ICD-10-PCS | Mod: CPTII,S$GLB,, | Performed by: INTERNAL MEDICINE

## 2022-04-05 PROCEDURE — 82550 ASSAY OF CK (CPK): CPT | Performed by: INTERNAL MEDICINE

## 2022-04-05 PROCEDURE — 3008F PR BODY MASS INDEX (BMI) DOCUMENTED: ICD-10-PCS | Mod: CPTII,S$GLB,, | Performed by: INTERNAL MEDICINE

## 2022-04-05 PROCEDURE — 85025 COMPLETE CBC W/AUTO DIFF WBC: CPT | Performed by: INTERNAL MEDICINE

## 2022-04-05 PROCEDURE — 1160F PR REVIEW ALL MEDS BY PRESCRIBER/CLIN PHARMACIST DOCUMENTED: ICD-10-PCS | Mod: CPTII,S$GLB,, | Performed by: INTERNAL MEDICINE

## 2022-04-05 PROCEDURE — 3075F SYST BP GE 130 - 139MM HG: CPT | Mod: CPTII,S$GLB,, | Performed by: INTERNAL MEDICINE

## 2022-04-05 PROCEDURE — 1159F MED LIST DOCD IN RCRD: CPT | Mod: CPTII,S$GLB,, | Performed by: INTERNAL MEDICINE

## 2022-04-05 PROCEDURE — 4010F PR ACE/ARB THEARPY RXD/TAKEN: ICD-10-PCS | Mod: CPTII,S$GLB,, | Performed by: INTERNAL MEDICINE

## 2022-04-05 PROCEDURE — 84153 ASSAY OF PSA TOTAL: CPT | Performed by: INTERNAL MEDICINE

## 2022-04-05 PROCEDURE — 99396 PR PREVENTIVE VISIT,EST,40-64: ICD-10-PCS | Mod: S$GLB,,, | Performed by: INTERNAL MEDICINE

## 2022-04-05 PROCEDURE — 4010F ACE/ARB THERAPY RXD/TAKEN: CPT | Mod: CPTII,S$GLB,, | Performed by: INTERNAL MEDICINE

## 2022-04-05 PROCEDURE — 3079F DIAST BP 80-89 MM HG: CPT | Mod: CPTII,S$GLB,, | Performed by: INTERNAL MEDICINE

## 2022-04-05 PROCEDURE — 1159F PR MEDICATION LIST DOCUMENTED IN MEDICAL RECORD: ICD-10-PCS | Mod: CPTII,S$GLB,, | Performed by: INTERNAL MEDICINE

## 2022-04-05 PROCEDURE — 3008F BODY MASS INDEX DOCD: CPT | Mod: CPTII,S$GLB,, | Performed by: INTERNAL MEDICINE

## 2022-04-05 PROCEDURE — 36415 COLL VENOUS BLD VENIPUNCTURE: CPT | Performed by: INTERNAL MEDICINE

## 2022-04-05 PROCEDURE — 1160F RVW MEDS BY RX/DR IN RCRD: CPT | Mod: CPTII,S$GLB,, | Performed by: INTERNAL MEDICINE

## 2022-04-05 PROCEDURE — 80061 LIPID PANEL: CPT | Performed by: INTERNAL MEDICINE

## 2022-04-05 PROCEDURE — 82306 VITAMIN D 25 HYDROXY: CPT | Performed by: INTERNAL MEDICINE

## 2022-04-05 PROCEDURE — 3079F PR MOST RECENT DIASTOLIC BLOOD PRESSURE 80-89 MM HG: ICD-10-PCS | Mod: CPTII,S$GLB,, | Performed by: INTERNAL MEDICINE

## 2022-04-05 PROCEDURE — 99396 PREV VISIT EST AGE 40-64: CPT | Mod: S$GLB,,, | Performed by: INTERNAL MEDICINE

## 2022-04-05 PROCEDURE — 82607 VITAMIN B-12: CPT | Performed by: INTERNAL MEDICINE

## 2022-04-05 PROCEDURE — 80053 COMPREHEN METABOLIC PANEL: CPT | Performed by: INTERNAL MEDICINE

## 2022-04-05 PROCEDURE — 86803 HEPATITIS C AB TEST: CPT | Performed by: INTERNAL MEDICINE

## 2022-04-05 RX ORDER — AMLODIPINE BESYLATE 2.5 MG/1
2.5 TABLET ORAL DAILY
Qty: 30 TABLET | Refills: 11 | Status: SHIPPED | OUTPATIENT
Start: 2022-04-05 | End: 2022-11-17

## 2022-04-05 NOTE — PROGRESS NOTES
Subjective:       Patient ID: Jerrell Bay IV is a 62 y.o. male.    Chief Complaint: Annual Exam    His BP at home is usually 135/85, sometimes in the low 140s.      Adult Wellness Exam:    Mental Conditions: None  Depression Risk Factors: None  BMI: See Vital signs   Colon screen:    See Health Maintenance Report                                     Vaccines (Flu, Adacel, Shingrix): See Health Maintenance Report  Routine labs (Cholesterol, Glucose/Hgb A1C, and TSH):ordered.     The patient's current health status is: Good   Patient was educated on all medical problems and routine health maintenance. See Patient Instructions.                               Hypertension  This is a chronic problem. The current episode started more than 1 year ago. The problem has been waxing and waning since onset.     Review of Systems   Constitutional: Negative.    Respiratory: Negative.    Cardiovascular: Negative.          Objective:      Physical Exam  Vitals and nursing note reviewed.   Constitutional:       General: He is not in acute distress.     Appearance: He is well-developed. He is not diaphoretic.   HENT:      Head: Normocephalic.      Right Ear: Ear canal and external ear normal.      Left Ear: Ear canal and external ear normal.      Nose: Nose normal.      Mouth/Throat:      Pharynx: No oropharyngeal exudate.   Eyes:      General: No scleral icterus.        Right eye: No discharge.         Left eye: No discharge.      Conjunctiva/sclera: Conjunctivae normal.      Pupils: Pupils are equal, round, and reactive to light.   Neck:      Thyroid: No thyromegaly.      Vascular: No JVD.   Cardiovascular:      Rate and Rhythm: Normal rate and regular rhythm.      Heart sounds: Normal heart sounds. No murmur heard.    No friction rub. No gallop.   Pulmonary:      Effort: Pulmonary effort is normal. No respiratory distress.      Breath sounds: Normal breath sounds. No stridor. No wheezing or rales.   Chest:      Chest wall: No  tenderness.   Abdominal:      General: Bowel sounds are normal. There is no distension.      Palpations: Abdomen is soft. There is no mass.      Tenderness: There is no abdominal tenderness. There is no guarding or rebound.   Musculoskeletal:         General: No tenderness. Normal range of motion.      Cervical back: Normal range of motion and neck supple.   Lymphadenopathy:      Cervical: No cervical adenopathy.   Skin:     General: Skin is warm and dry.      Findings: No rash.   Neurological:      Mental Status: He is alert and oriented to person, place, and time.      Cranial Nerves: No cranial nerve deficit.      Coordination: Coordination normal.      Deep Tendon Reflexes: Reflexes are normal and symmetric. Reflexes normal.   Psychiatric:         Behavior: Behavior normal.         Assessment:       Problem List Items Addressed This Visit        Unprioritized    Mild intermittent asthma without complication - Primary    High cholesterol    Essential hypertension      Other Visit Diagnoses     Routine adult health maintenance              Plan:       Per orders and D/C instructions.  Continue diet and/or meds for Asthma and high cholesterol, which are stable.     add amlodipine for hypertension.  Check routine labs.

## 2022-04-05 NOTE — PATIENT INSTRUCTIONS
Continue a low-sodium diet, exercise, and weight loss.      Tips for Healthy Living and Routine Preventative Care - 2022                                                            (These guidelines are intended for healthy adults)      1. Exercise  Exercise aerobically with a target heart rate of (220-age) x 0.8  Exercise 30-45 minutes on most days of the week    2. Diet and Supplements- All supplements can be obtained through a varied, healthy diet   Calcium: 1,000 - 1,200 mg each day   8 oz milk or Calcium fortified O.J. = 300, 8 oz Yogurt = 400 mg, 1 oz of cheese =100-200 mg              8 oz Oatmeal = 215 mg, 3 oz [place] = 240 mg  Vitamin D: 800 iu each day- Can probably be obtained by 30 min. of direct sunlight    each day             3 oz. [place] = 800 iu,  3 oz. Tuna =150 iu, Milk or fortified O.J. = 120 iu  Fish oil: 1-2 grams each day or about 840 mg of EPA and DHA (Omega-3 fatty acids) each day             3 oz. [place]=2 grams,  3 oz. Tuna=1.3 grams,  3 oz. drained light Tuna= 0.25 grams  Folic acid 800 mcg each day for all women planning or capable of pregnancy    3. Lifestyle  Alcohol: 1 drink = 12 oz. domestic beer, 4 oz. wine, or 1 oz. hard (80 proof) liquor             Males: </= 14 drinks per week with no more than 4 in any one day             Females: </= 7 drinks per week with no more than 3 in any one day  Salt: 1.2 - 3 grams of Sodium each day.  Tobacco: Dont smoke, or quit smoking (discuss with your doctor)  Depression: If you feel depressed discuss with your doctor  Weight: Maintain a healthy body weight. Stay within 10% of:             Males: 106 lbs. + 6 lbs per inch height above 5 feet             Females: 100 lbs + 5 lbs per inch height above 5 feet    4. Routine tests  Blood pressure check at each visit, or at least once each year  HIV screening (one time) if less than 65 years old  Hepatitis C screen (one time) if less than 80 years old  Cholesterol screening every 3 years starting at  age 21  Glucose/Hemaglobin A1C check every 2-3 years starting at age 45. Check at age 35 if overweight  TSH (thyroid screen) every 2 years starting at age 50  Colonoscopy at age 50 (or age 45), and repeat every 10 years until age 75. Consider screening until age 85  Vision screen at age 65    Females:  Gyn exam with cervical HPV test every 3 years or Pap smear every three years starting at age 25                  Stop screening at age 65 if past 3 exams were normal                  No screening for women who have had a hysterectomy with removal of cervix  Mammogram every 1-2 years starting at age 40 (or age 50) until age 75  Consider continuing Mammograms every other year for those older than 75 with a life expectancy of more than 10 years  Bone density scan at about age 65    Males:  PSA screening annually at age 50, age 45 for  Americans, until age 75. Consider annual screening after age 75                   5. Immunizations  Influenza vaccine every year in the fall, especially if >50 or with a chronic disease  Tetanus/Diphtheria/Pertussis (Tdap) vaccine once (after the age of 18), then Tetanus/Diphtheria (Td) or Tdap vaccine every 10 years  Shingles (Shingrix) vaccine after age 50 and get a 2nd dose after 2-6 months  Pneumonia vaccine at age 65. 2nd Pneumonia vaccine at least 1 year later (1st should be Prevnar-13, and 2nd should be Pneumovax-23).        6. Advanced Directive/End of life care planning  You should consider having a signed document which informs physicians and family of your end of life care wishes.  You can go to [email or web address]. Under Step 1 click download AdobePDF and print.  This is the [place] physician order for scope of Treatment ([employer]) form.  You can also request a blank copy of the [employer] form from my office.  Bring a copy of the signed document to my office so we can scan it in your medical chart.

## 2022-04-06 ENCOUNTER — PATIENT MESSAGE (OUTPATIENT)
Dept: INTERNAL MEDICINE | Facility: CLINIC | Age: 62
End: 2022-04-06
Payer: COMMERCIAL

## 2022-04-06 LAB — HCV AB SERPL QL IA: NEGATIVE

## 2022-06-16 DIAGNOSIS — J45.20 MILD INTERMITTENT ASTHMA WITHOUT COMPLICATION: Primary | ICD-10-CM

## 2022-06-16 RX ORDER — ALBUTEROL SULFATE 90 UG/1
2 AEROSOL, METERED RESPIRATORY (INHALATION) EVERY 6 HOURS PRN
Qty: 8.5 G | Refills: 2 | Status: SHIPPED | OUTPATIENT
Start: 2022-06-16 | End: 2023-05-24

## 2022-07-19 ENCOUNTER — LAB VISIT (OUTPATIENT)
Dept: LAB | Facility: HOSPITAL | Age: 62
End: 2022-07-19
Payer: COMMERCIAL

## 2022-07-19 DIAGNOSIS — R97.20 ELEVATED PSA: ICD-10-CM

## 2022-07-19 LAB — COMPLEXED PSA SERPL-MCNC: 6.1 NG/ML (ref 0–4)

## 2022-07-19 PROCEDURE — 36415 COLL VENOUS BLD VENIPUNCTURE: CPT | Mod: PO | Performed by: NURSE PRACTITIONER

## 2022-07-19 PROCEDURE — 84153 ASSAY OF PSA TOTAL: CPT | Performed by: NURSE PRACTITIONER

## 2022-07-20 ENCOUNTER — PATIENT MESSAGE (OUTPATIENT)
Dept: UROLOGY | Facility: CLINIC | Age: 62
End: 2022-07-20
Payer: COMMERCIAL

## 2022-07-20 DIAGNOSIS — R97.20 ELEVATED PSA: Primary | ICD-10-CM

## 2022-11-15 ENCOUNTER — LAB VISIT (OUTPATIENT)
Dept: LAB | Facility: OTHER | Age: 62
End: 2022-11-15
Attending: INTERNAL MEDICINE
Payer: COMMERCIAL

## 2022-11-15 ENCOUNTER — OFFICE VISIT (OUTPATIENT)
Dept: INTERNAL MEDICINE | Facility: CLINIC | Age: 62
End: 2022-11-15
Attending: INTERNAL MEDICINE
Payer: COMMERCIAL

## 2022-11-15 VITALS
HEART RATE: 85 BPM | DIASTOLIC BLOOD PRESSURE: 82 MMHG | HEIGHT: 72 IN | SYSTOLIC BLOOD PRESSURE: 138 MMHG | BODY MASS INDEX: 29.39 KG/M2 | WEIGHT: 217 LBS | OXYGEN SATURATION: 97 %

## 2022-11-15 DIAGNOSIS — E78.00 HIGH CHOLESTEROL: ICD-10-CM

## 2022-11-15 DIAGNOSIS — M19.011 PRIMARY OSTEOARTHRITIS OF RIGHT SHOULDER: ICD-10-CM

## 2022-11-15 DIAGNOSIS — E03.9 HYPOTHYROIDISM, UNSPECIFIED TYPE: ICD-10-CM

## 2022-11-15 DIAGNOSIS — E78.9 DISORDER OF LIPID METABOLISM: ICD-10-CM

## 2022-11-15 DIAGNOSIS — Z12.5 SCREENING FOR PROSTATE CANCER: ICD-10-CM

## 2022-11-15 DIAGNOSIS — D51.0 PERNICIOUS ANEMIA: ICD-10-CM

## 2022-11-15 DIAGNOSIS — D50.8 OTHER IRON DEFICIENCY ANEMIA: ICD-10-CM

## 2022-11-15 DIAGNOSIS — R79.89 OTHER SPECIFIED ABNORMAL FINDINGS OF BLOOD CHEMISTRY: ICD-10-CM

## 2022-11-15 DIAGNOSIS — J45.20 MILD INTERMITTENT ASTHMA WITHOUT COMPLICATION: ICD-10-CM

## 2022-11-15 DIAGNOSIS — I10 ESSENTIAL HYPERTENSION: Primary | ICD-10-CM

## 2022-11-15 DIAGNOSIS — Z01.818 PRE-OP EVALUATION: ICD-10-CM

## 2022-11-15 DIAGNOSIS — R97.20 ELEVATED PSA: Primary | ICD-10-CM

## 2022-11-15 DIAGNOSIS — R97.20 ELEVATED PSA: ICD-10-CM

## 2022-11-15 DIAGNOSIS — E55.9 VITAMIN D DEFICIENCY: ICD-10-CM

## 2022-11-15 LAB
ALBUMIN SERPL BCP-MCNC: 4.1 G/DL (ref 3.5–5.2)
ALP SERPL-CCNC: 53 U/L (ref 55–135)
ALT SERPL W/O P-5'-P-CCNC: 29 U/L (ref 10–44)
ANION GAP SERPL CALC-SCNC: 11 MMOL/L (ref 8–16)
AST SERPL-CCNC: 27 U/L (ref 10–40)
BASOPHILS # BLD AUTO: 0.04 K/UL (ref 0–0.2)
BASOPHILS NFR BLD: 0.6 % (ref 0–1.9)
BILIRUB SERPL-MCNC: 0.7 MG/DL (ref 0.1–1)
BUN SERPL-MCNC: 17 MG/DL (ref 8–23)
CALCIUM SERPL-MCNC: 9.9 MG/DL (ref 8.7–10.5)
CHLORIDE SERPL-SCNC: 100 MMOL/L (ref 95–110)
CHOLEST SERPL-MCNC: 211 MG/DL (ref 120–199)
CHOLEST/HDLC SERPL: 3.2 {RATIO} (ref 2–5)
CO2 SERPL-SCNC: 29 MMOL/L (ref 23–29)
CREAT SERPL-MCNC: 1 MG/DL (ref 0.5–1.4)
DIFFERENTIAL METHOD: ABNORMAL
EOSINOPHIL # BLD AUTO: 0.6 K/UL (ref 0–0.5)
EOSINOPHIL NFR BLD: 9.5 % (ref 0–8)
ERYTHROCYTE [DISTWIDTH] IN BLOOD BY AUTOMATED COUNT: 12.4 % (ref 11.5–14.5)
EST. GFR  (NO RACE VARIABLE): >60 ML/MIN/1.73 M^2
GLUCOSE SERPL-MCNC: 98 MG/DL (ref 70–110)
HCT VFR BLD AUTO: 45.5 % (ref 40–54)
HDLC SERPL-MCNC: 65 MG/DL (ref 40–75)
HDLC SERPL: 30.8 % (ref 20–50)
HGB BLD-MCNC: 15.5 G/DL (ref 14–18)
IMM GRANULOCYTES # BLD AUTO: 0.02 K/UL (ref 0–0.04)
IMM GRANULOCYTES NFR BLD AUTO: 0.3 % (ref 0–0.5)
LDLC SERPL CALC-MCNC: 123 MG/DL (ref 63–159)
LYMPHOCYTES # BLD AUTO: 1.3 K/UL (ref 1–4.8)
LYMPHOCYTES NFR BLD: 20.5 % (ref 18–48)
MCH RBC QN AUTO: 32.1 PG (ref 27–31)
MCHC RBC AUTO-ENTMCNC: 34.1 G/DL (ref 32–36)
MCV RBC AUTO: 94 FL (ref 82–98)
MONOCYTES # BLD AUTO: 0.9 K/UL (ref 0.3–1)
MONOCYTES NFR BLD: 13.9 % (ref 4–15)
NEUTROPHILS # BLD AUTO: 3.5 K/UL (ref 1.8–7.7)
NEUTROPHILS NFR BLD: 55.2 % (ref 38–73)
NONHDLC SERPL-MCNC: 146 MG/DL
NRBC BLD-RTO: 0 /100 WBC
PLATELET # BLD AUTO: 269 K/UL (ref 150–450)
PMV BLD AUTO: 9.4 FL (ref 9.2–12.9)
POTASSIUM SERPL-SCNC: 3.9 MMOL/L (ref 3.5–5.1)
PROSTATE SPECIFIC ANTIGEN, TOTAL: 6 NG/ML (ref 0–4)
PROT SERPL-MCNC: 7.3 G/DL (ref 6–8.4)
PSA FREE MFR SERPL: 20.5 %
PSA FREE SERPL-MCNC: 1.23 NG/ML (ref 0–1.5)
RBC # BLD AUTO: 4.83 M/UL (ref 4.6–6.2)
SODIUM SERPL-SCNC: 140 MMOL/L (ref 136–145)
TESTOST SERPL-MCNC: 480 NG/DL (ref 304–1227)
TRIGL SERPL-MCNC: 115 MG/DL (ref 30–150)
WBC # BLD AUTO: 6.4 K/UL (ref 3.9–12.7)

## 2022-11-15 PROCEDURE — 1159F PR MEDICATION LIST DOCUMENTED IN MEDICAL RECORD: ICD-10-PCS | Mod: CPTII,S$GLB,, | Performed by: INTERNAL MEDICINE

## 2022-11-15 PROCEDURE — 80061 LIPID PANEL: CPT | Performed by: INTERNAL MEDICINE

## 2022-11-15 PROCEDURE — 1160F PR REVIEW ALL MEDS BY PRESCRIBER/CLIN PHARMACIST DOCUMENTED: ICD-10-PCS | Mod: CPTII,S$GLB,, | Performed by: INTERNAL MEDICINE

## 2022-11-15 PROCEDURE — 4010F ACE/ARB THERAPY RXD/TAKEN: CPT | Mod: CPTII,S$GLB,, | Performed by: INTERNAL MEDICINE

## 2022-11-15 PROCEDURE — 99214 PR OFFICE/OUTPT VISIT, EST, LEVL IV, 30-39 MIN: ICD-10-PCS | Mod: S$GLB,,, | Performed by: INTERNAL MEDICINE

## 2022-11-15 PROCEDURE — 1160F RVW MEDS BY RX/DR IN RCRD: CPT | Mod: CPTII,S$GLB,, | Performed by: INTERNAL MEDICINE

## 2022-11-15 PROCEDURE — 84154 ASSAY OF PSA FREE: CPT | Performed by: INTERNAL MEDICINE

## 2022-11-15 PROCEDURE — 3008F BODY MASS INDEX DOCD: CPT | Mod: CPTII,S$GLB,, | Performed by: INTERNAL MEDICINE

## 2022-11-15 PROCEDURE — 93000 PR ELECTROCARDIOGRAM, COMPLETE: ICD-10-PCS | Mod: S$GLB,,, | Performed by: INTERNAL MEDICINE

## 2022-11-15 PROCEDURE — 3075F SYST BP GE 130 - 139MM HG: CPT | Mod: CPTII,S$GLB,, | Performed by: INTERNAL MEDICINE

## 2022-11-15 PROCEDURE — 3075F PR MOST RECENT SYSTOLIC BLOOD PRESS GE 130-139MM HG: ICD-10-PCS | Mod: CPTII,S$GLB,, | Performed by: INTERNAL MEDICINE

## 2022-11-15 PROCEDURE — 99214 OFFICE O/P EST MOD 30 MIN: CPT | Mod: S$GLB,,, | Performed by: INTERNAL MEDICINE

## 2022-11-15 PROCEDURE — 3079F DIAST BP 80-89 MM HG: CPT | Mod: CPTII,S$GLB,, | Performed by: INTERNAL MEDICINE

## 2022-11-15 PROCEDURE — 4010F PR ACE/ARB THEARPY RXD/TAKEN: ICD-10-PCS | Mod: CPTII,S$GLB,, | Performed by: INTERNAL MEDICINE

## 2022-11-15 PROCEDURE — 3079F PR MOST RECENT DIASTOLIC BLOOD PRESSURE 80-89 MM HG: ICD-10-PCS | Mod: CPTII,S$GLB,, | Performed by: INTERNAL MEDICINE

## 2022-11-15 PROCEDURE — 84153 ASSAY OF PSA TOTAL: CPT | Performed by: INTERNAL MEDICINE

## 2022-11-15 PROCEDURE — 85025 COMPLETE CBC W/AUTO DIFF WBC: CPT | Performed by: INTERNAL MEDICINE

## 2022-11-15 PROCEDURE — 80053 COMPREHEN METABOLIC PANEL: CPT | Performed by: INTERNAL MEDICINE

## 2022-11-15 PROCEDURE — 1159F MED LIST DOCD IN RCRD: CPT | Mod: CPTII,S$GLB,, | Performed by: INTERNAL MEDICINE

## 2022-11-15 PROCEDURE — 3008F PR BODY MASS INDEX (BMI) DOCUMENTED: ICD-10-PCS | Mod: CPTII,S$GLB,, | Performed by: INTERNAL MEDICINE

## 2022-11-15 PROCEDURE — 3044F HG A1C LEVEL LT 7.0%: CPT | Mod: CPTII,S$GLB,, | Performed by: INTERNAL MEDICINE

## 2022-11-15 PROCEDURE — 84403 ASSAY OF TOTAL TESTOSTERONE: CPT | Performed by: INTERNAL MEDICINE

## 2022-11-15 PROCEDURE — 36415 COLL VENOUS BLD VENIPUNCTURE: CPT | Performed by: INTERNAL MEDICINE

## 2022-11-15 PROCEDURE — 3044F PR MOST RECENT HEMOGLOBIN A1C LEVEL <7.0%: ICD-10-PCS | Mod: CPTII,S$GLB,, | Performed by: INTERNAL MEDICINE

## 2022-11-15 PROCEDURE — 93000 ELECTROCARDIOGRAM COMPLETE: CPT | Mod: S$GLB,,, | Performed by: INTERNAL MEDICINE

## 2022-11-15 NOTE — PATIENT INSTRUCTIONS
Stop taking Motrin at least 3 days prior to your shoulder replacement surgery.  Do not take irbesartan or Maxzide the morning of surgery.  Take your other medications as scheduled the morning of surgery with sips of water.

## 2022-11-15 NOTE — PROGRESS NOTES
Subjective:       Patient ID: Jerrell Bay IV is a 62 y.o. male.    Chief Complaint: Pre-op Exam (R shoulder replacement )    He is scheduled for right total shoulder replacement on December 6, 2022 by Dr. Aguila    Hypertension  This is a chronic problem. The current episode started more than 1 year ago. The problem is controlled.   Review of Systems   Constitutional: Negative.    Respiratory: Negative.     Cardiovascular: Negative.    Musculoskeletal:  Positive for arthralgias.       Objective:      Physical Exam  Vitals and nursing note reviewed.   Constitutional:       Appearance: He is well-developed.   HENT:      Head: Normocephalic and atraumatic.   Eyes:      Pupils: Pupils are equal, round, and reactive to light.   Cardiovascular:      Rate and Rhythm: Normal rate and regular rhythm.      Heart sounds: Normal heart sounds.   Pulmonary:      Effort: Pulmonary effort is normal.   Neurological:      Mental Status: He is alert.       Assessment:       Problem List Items Addressed This Visit          Unprioritized    Essential hypertension - Primary    Relevant Orders    EKG 12-lead    Mild intermittent asthma without complication    High cholesterol    DJD of right shoulder     Other Visit Diagnoses       Pre-op evaluation                  Plan:       Per orders and D/C instructions.  Continue diet and/or meds for Asthma, HTN, and high cholesterol, which are stable.  He is medically optimized for right total shoulder replacement pending labs.  Stop taking Motrin at least 3 days prior to your shoulder replacement surgery.  Do not take irbesartan or Maxzide the morning of surgery.  Take your other medications as scheduled the morning of surgery with sips of water.    Between 30 and 39 min of total time for evaluation and management services were spent on the patient today.  The medical problems and treatment options were discussed, and all questions were answered.

## 2022-11-16 ENCOUNTER — PATIENT MESSAGE (OUTPATIENT)
Dept: INTERNAL MEDICINE | Facility: CLINIC | Age: 62
End: 2022-11-16
Payer: COMMERCIAL

## 2023-01-09 ENCOUNTER — PATIENT MESSAGE (OUTPATIENT)
Dept: UROLOGY | Facility: CLINIC | Age: 63
End: 2023-01-09
Payer: COMMERCIAL

## 2023-01-10 DIAGNOSIS — R97.20 ELEVATED PSA: Primary | ICD-10-CM

## 2023-05-24 ENCOUNTER — OFFICE VISIT (OUTPATIENT)
Dept: INTERNAL MEDICINE | Facility: CLINIC | Age: 63
End: 2023-05-24
Attending: INTERNAL MEDICINE
Payer: MEDICARE

## 2023-05-24 VITALS
DIASTOLIC BLOOD PRESSURE: 78 MMHG | SYSTOLIC BLOOD PRESSURE: 122 MMHG | BODY MASS INDEX: 27.63 KG/M2 | HEART RATE: 65 BPM | WEIGHT: 204 LBS | OXYGEN SATURATION: 97 % | HEIGHT: 72 IN

## 2023-05-24 DIAGNOSIS — J45.20 MILD INTERMITTENT ASTHMA WITHOUT COMPLICATION: ICD-10-CM

## 2023-05-24 DIAGNOSIS — I10 ESSENTIAL HYPERTENSION: Primary | ICD-10-CM

## 2023-05-24 DIAGNOSIS — Z13.39 SCREENING FOR ALCOHOLISM: ICD-10-CM

## 2023-05-24 DIAGNOSIS — E78.00 HIGH CHOLESTEROL: ICD-10-CM

## 2023-05-24 DIAGNOSIS — Z13.31 SCREENING FOR DEPRESSION: ICD-10-CM

## 2023-05-24 PROCEDURE — G0009 PNEUMOCOCCAL CONJUGATE VACCINE 20-VALENT: ICD-10-PCS | Mod: S$GLB,,, | Performed by: INTERNAL MEDICINE

## 2023-05-24 PROCEDURE — 99214 OFFICE O/P EST MOD 30 MIN: CPT | Mod: 25,S$GLB,, | Performed by: INTERNAL MEDICINE

## 2023-05-24 PROCEDURE — G0009 ADMIN PNEUMOCOCCAL VACCINE: HCPCS | Mod: S$GLB,,, | Performed by: INTERNAL MEDICINE

## 2023-05-24 PROCEDURE — G0444 DEPRESSION SCREEN ANNUAL: HCPCS | Mod: 59,S$GLB,, | Performed by: INTERNAL MEDICINE

## 2023-05-24 PROCEDURE — G0442 PR  ALCOHOL SCREENING: ICD-10-PCS | Mod: 59,S$GLB,, | Performed by: INTERNAL MEDICINE

## 2023-05-24 PROCEDURE — 90677 PCV20 VACCINE IM: CPT | Mod: S$GLB,,, | Performed by: INTERNAL MEDICINE

## 2023-05-24 PROCEDURE — 99214 PR OFFICE/OUTPT VISIT, EST, LEVL IV, 30-39 MIN: ICD-10-PCS | Mod: 25,S$GLB,, | Performed by: INTERNAL MEDICINE

## 2023-05-24 PROCEDURE — G0442 ANNUAL ALCOHOL SCREEN 15 MIN: HCPCS | Mod: 59,S$GLB,, | Performed by: INTERNAL MEDICINE

## 2023-05-24 PROCEDURE — G0444 PR DEPRESSION SCREENING: ICD-10-PCS | Mod: 59,S$GLB,, | Performed by: INTERNAL MEDICINE

## 2023-05-24 PROCEDURE — 90677 PNEUMOCOCCAL CONJUGATE VACCINE 20-VALENT: ICD-10-PCS | Mod: S$GLB,,, | Performed by: INTERNAL MEDICINE

## 2023-05-24 RX ORDER — IRBESARTAN 300 MG/1
300 TABLET ORAL NIGHTLY
Qty: 90 TABLET | Refills: 3 | Status: SHIPPED | OUTPATIENT
Start: 2023-05-24 | End: 2024-05-23

## 2023-05-24 RX ORDER — BUDESONIDE AND FORMOTEROL FUMARATE DIHYDRATE 80; 4.5 UG/1; UG/1
2 AEROSOL RESPIRATORY (INHALATION) 2 TIMES DAILY PRN
Qty: 10.2 G | Refills: 1 | Status: SHIPPED | OUTPATIENT
Start: 2023-05-24 | End: 2024-01-21

## 2023-05-24 RX ORDER — ROSUVASTATIN CALCIUM 10 MG/1
10 TABLET, COATED ORAL DAILY
Qty: 90 TABLET | Refills: 3 | Status: SHIPPED | OUTPATIENT
Start: 2023-05-24 | End: 2024-03-01

## 2023-05-24 RX ORDER — AMLODIPINE BESYLATE 2.5 MG/1
2.5 TABLET ORAL DAILY
Qty: 90 TABLET | Refills: 3 | Status: SHIPPED | OUTPATIENT
Start: 2023-05-24 | End: 2023-11-28

## 2023-05-24 RX ORDER — TRIAMTERENE AND HYDROCHLOROTHIAZIDE 75; 50 MG/1; MG/1
1 TABLET ORAL DAILY
Qty: 90 TABLET | Refills: 3 | Status: SHIPPED | OUTPATIENT
Start: 2023-05-24 | End: 2023-08-16

## 2023-05-24 NOTE — PATIENT INSTRUCTIONS
Tips for Healthy Living and Routine Preventative Care - 2023                                                            (These guidelines are intended for healthy adults)      1. Exercise  Exercise aerobically with a target heart rate of (220-age) x 0.8  Exercise 30-45 minutes on most days of the week    2. Diet and Supplements- All supplements can be obtained through a varied, healthy diet   Calcium: 1,000 - 1,200 mg each day   8 oz milk or Calcium fortified O.J. = 300, 8 oz Yogurt = 400 mg, 1 oz of cheese =100-200 mg              8 oz Oatmeal = 215 mg, 3 oz [place] = 240 mg  Vitamin D: 800 iu each day- Can probably be obtained by 30 min. of direct sunlight    each day             3 oz. [place] = 800 iu,  3 oz. Tuna =150 iu, Milk or fortified O.J. = 120 iu  Fish oil: 1-2 grams each day or about 840 mg of EPA and DHA (Omega-3 fatty acids) each day             3 oz. [place]=2 grams,  3 oz. Tuna=1.3 grams,  3 oz. drained light Tuna= 0.25 grams  Folic acid 800 mcg each day for all women planning or capable of pregnancy    3. Lifestyle  Alcohol: 1 drink = 12 oz. domestic beer, 4 oz. wine, or 1 oz. hard (80 proof) liquor             Males: </= 14 drinks per week with no more than 4 in any one day             Females: </= 7 drinks per week with no more than 3 in any one day  Salt: 1.2 - 3 grams of Sodium each day.  Tobacco: Dont smoke, or quit smoking (discuss with your doctor)  Depression: If you feel depressed discuss with your doctor  Weight: Maintain a healthy body weight. Stay within 10% of:             Males: 106 lbs. + 6 lbs per inch height above 5 feet             Females: 100 lbs + 5 lbs per inch height above 5 feet    4. Routine tests  Blood pressure check at each visit, or at least once each year  HIV screening (one time) if less than 65 years old  Hepatitis C screen (one time) if less than 80 years old  Cholesterol screening every 3 years starting at age 21  Glucose/Hemaglobin A1C check every 3 years starting at  age 35.  TSH (thyroid screen) every 2 years starting at age 50  Colonoscopy at age 45, and repeat every 10 years until age 75. Consider screening until age 85. DNA stool test (Cologuard) every 3 years is an acceptable alternative.  Vision screen at age 65    Females:  Gyn exam with cervical HPV test every 3 years or Pap smear every three years starting at age 25                  Stop screening at age 65 if past 3 exams were normal                  No screening for women who have had a hysterectomy with removal of cervix  Mammogram every 1-2 years starting at age 40 until age 75  Consider continuing Mammograms every other year for those older than 75 with a life expectancy of more than 10 years  Bone density scan at about age 65    Males:  PSA screening annually at age 50, age 45 for  Americans, until age 75. Consider annual screening after age 75                   5. Immunizations  Influenza vaccine every year in the fall, especially if >50 or with a chronic disease  Tetanus/Diphtheria/Pertussis (Tdap) vaccine once (after the age of 18), then Tetanus/Diphtheria (Td) or Tdap vaccine every 10 years  Shingles (Shingrix) vaccine after age 50 and get a 2nd dose after 2-6 months  Pneumonia vaccine (Prevnar-20) at age 65       6. Advanced Directive/End of life care planning  You should consider having a signed document which informs physicians and family of your end of life care wishes.  You can go to [email or web address]. Under Step 1 click download AdobePDF and print.  This is the [place] physician order for scope of Treatment ([employer]) form.  You can also request a blank copy of the [employer] form from my office.  Bring a copy of the signed document to my office so we can scan it in your medical chart.

## 2023-05-24 NOTE — PROGRESS NOTES
Subjective     Patient ID: Jerrell Bay IV is a 63 y.o. male.    Chief Complaint: Annual Exam (Refill all meds for 90 day ) and Hypertension    He is lost about 20 lb since beginning of the year through a better diet and minimal alcohol intake.      Hypertension  This is a chronic problem. The current episode started more than 1 year ago. The problem is controlled.   Review of Systems   Constitutional: Negative.    Respiratory: Negative.     Cardiovascular: Negative.    Musculoskeletal:  Positive for arthralgias.        Objective     Physical Exam  Vitals and nursing note reviewed.   Constitutional:       Appearance: He is well-developed.   HENT:      Head: Normocephalic and atraumatic.   Eyes:      Pupils: Pupils are equal, round, and reactive to light.   Cardiovascular:      Rate and Rhythm: Normal rate and regular rhythm.      Heart sounds: Normal heart sounds.   Pulmonary:      Effort: Pulmonary effort is normal.   Neurological:      Mental Status: He is alert.          Assessment and Plan     Problem List Items Addressed This Visit          Unprioritized    Essential hypertension - Primary    Relevant Medications    triamterene-hydrochlorothiazide 75-50 mg (MAXZIDE) 75-50 mg per tablet    Mild intermittent asthma without complication    Relevant Medications    budesonide-formoterol 80-4.5 mcg (SYMBICORT) 80-4.5 mcg/actuation HFAA    High cholesterol    Relevant Medications    rosuvastatin (CRESTOR) 10 MG tablet       Per orders and D/C instructions.  Continue diet and/or meds for Asthma, HTN, and high cholesterol, which are stable.   Prevnar 20 vaccine today.    Screening: The patient was screened for depression with the PHQ2 questionnaire and possible health consequences were discussed with the patient, who understands (15 minutes spent).       The patient was screened for the misuse of alcohol, by asking the number of drinks per average week, and if pt has had more than 4 drinks (more than 3 for women and  elderly) in 1 day within the past year. The health and legal consequences of misuse were discussed (15 minutes spent).

## 2023-05-25 ENCOUNTER — PATIENT MESSAGE (OUTPATIENT)
Dept: INTERNAL MEDICINE | Facility: CLINIC | Age: 63
End: 2023-05-25
Payer: MEDICARE

## 2023-06-19 ENCOUNTER — LAB VISIT (OUTPATIENT)
Dept: LAB | Facility: HOSPITAL | Age: 63
End: 2023-06-19
Payer: MEDICARE

## 2023-06-19 DIAGNOSIS — R97.20 ELEVATED PSA: ICD-10-CM

## 2023-06-19 LAB
PROSTATE SPECIFIC ANTIGEN, TOTAL: 4.2 NG/ML (ref 0–4)
PSA FREE MFR SERPL: 25.95 %
PSA FREE SERPL-MCNC: 1.09 NG/ML (ref 0–1.5)

## 2023-06-19 PROCEDURE — 36415 COLL VENOUS BLD VENIPUNCTURE: CPT | Mod: PO | Performed by: NURSE PRACTITIONER

## 2023-06-19 PROCEDURE — 84153 ASSAY OF PSA TOTAL: CPT | Performed by: NURSE PRACTITIONER

## 2023-06-20 ENCOUNTER — OFFICE VISIT (OUTPATIENT)
Dept: UROLOGY | Facility: CLINIC | Age: 63
End: 2023-06-20
Payer: MEDICARE

## 2023-06-20 ENCOUNTER — PATIENT MESSAGE (OUTPATIENT)
Dept: UROLOGY | Facility: CLINIC | Age: 63
End: 2023-06-20

## 2023-06-20 VITALS
HEIGHT: 72 IN | SYSTOLIC BLOOD PRESSURE: 157 MMHG | WEIGHT: 209.19 LBS | DIASTOLIC BLOOD PRESSURE: 81 MMHG | HEART RATE: 68 BPM | BODY MASS INDEX: 28.33 KG/M2

## 2023-06-20 DIAGNOSIS — Z12.5 PROSTATE CANCER SCREENING: Primary | ICD-10-CM

## 2023-06-20 DIAGNOSIS — R97.20 ELEVATED PSA: ICD-10-CM

## 2023-06-20 PROCEDURE — 99213 OFFICE O/P EST LOW 20 MIN: CPT | Mod: S$GLB,,, | Performed by: NURSE PRACTITIONER

## 2023-06-20 PROCEDURE — 99213 PR OFFICE/OUTPT VISIT, EST, LEVL III, 20-29 MIN: ICD-10-PCS | Mod: S$GLB,,, | Performed by: NURSE PRACTITIONER

## 2023-06-20 NOTE — PROGRESS NOTES
"Subjective:      Jerrell Bay IV is a 63 y.o. male who returns today regarding his elevated PSA.    The patient has a history of elevated PSA.    Component      Latest Ref Rng & Units 6/19/2023 11/15/2022 7/19/2022 4/5/2022   PSA Total      0.00 - 4.00 ng/mL 4.2 (H) 6.0 (H)     PSA, Free      0.00 - 1.50 ng/mL 1.09 1.23     PSA, Free %      Not established % 25.95 20.50     PSA Diagnostic      0.00 - 4.00 ng/mL   6.1 (H)    PSA, Screen      0.00 - 4.00 ng/mL    5.7 (H)      Prostate MRI in July 2021 demonstrated "PIRADS 2: BPH.  No focal lesions suspicious for clinically significant cancer.."  Prostate biopsy was cancelled as PSA density was within the normal range.     Denies bothersome urinary symptoms. Occasional frequency and nocturia but low bother.   Denies a family history of prostate cancer.      Has RX for viagra but not currently using this.     The following portions of the patient's history were reviewed and updated as appropriate: allergies, current medications, past family history, past medical history, past social history, past surgical history and problem list.    Review of Systems  Constitutional: no fever or chills  ENT: no nasal congestion or sore throat  Respiratory: no cough or shortness of breath  Cardiovascular: no chest pain or palpitations  Gastrointestinal: no nausea or vomiting, tolerating diet  Genitourinary: as per HPI  Hematologic/Lymphatic: no easy bruising or lymphadenopathy  Musculoskeletal: no arthralgias or myalgias  Neurological: no seizures or tremors  Behavioral/Psych: no auditory or visual hallucinations     Objective:   Vitals: BP (!) 157/81 (BP Location: Right arm, Patient Position: Sitting, BP Method: Large (Automatic))   Pulse 68   Ht 6' (1.829 m)   Wt 94.9 kg (209 lb 3.5 oz)   BMI 28.37 kg/m²     Physical Exam   General: alert and oriented, no acute distress  Head: normocephalic, atraumatic  Neck: supple, normal ROM  Respiratory: Symmetric expansion, non-labored " breathing  Cardiovascular: regular rate and rhythm  Abdomen: soft, non tender, non distended  Genitourinary: pt declined TONEY  Skin: normal coloration and turgor, no rashes, no suspicious skin lesions noted  Neuro: alert and oriented x3, no gross deficits  Psych: normal judgment and insight, normal mood/affect, and non-anxious    Lab Review   Urinalysis demonstrates : no sample   Lab Results   Component Value Date    WBC 6.40 11/15/2022    HGB 15.5 11/15/2022    HCT 45.5 11/15/2022    MCV 94 11/15/2022     11/15/2022     Lab Results   Component Value Date    CREATININE 1.0 11/15/2022    BUN 17 11/15/2022     Lab Results   Component Value Date    PSA 5.7 (H) 04/05/2022     Imaging   None    Assessment:     1. Prostate cancer screening    2. Elevated PSA      Plan:   Jerrell was seen today for follow-up.    Diagnoses and all orders for this visit:    Prostate cancer screening    Elevated PSA  -     PSA, Total and Free; Future    Plan:  --PSA improved from last check  --Repeat PSA in 6 months. If PSA rises again will repeat MRI prostate

## 2023-08-16 DIAGNOSIS — I10 ESSENTIAL HYPERTENSION: ICD-10-CM

## 2023-08-16 RX ORDER — TRIAMTERENE AND HYDROCHLOROTHIAZIDE 75; 50 MG/1; MG/1
1 TABLET ORAL DAILY
Qty: 90 TABLET | Refills: 1 | Status: SHIPPED | OUTPATIENT
Start: 2023-08-16 | End: 2023-11-17 | Stop reason: SDUPTHER

## 2023-09-01 NOTE — TELEPHONE ENCOUNTER
Alec Davis MD  8/31/2023  4:26 PM EDT       There is a short segment of Neil's esophagus.  The patient can have repeat EGD in 3 years.      Please see the attached refill request.

## 2023-11-17 ENCOUNTER — LAB VISIT (OUTPATIENT)
Dept: LAB | Facility: OTHER | Age: 63
End: 2023-11-17
Attending: INTERNAL MEDICINE
Payer: MEDICARE

## 2023-11-17 ENCOUNTER — OFFICE VISIT (OUTPATIENT)
Dept: INTERNAL MEDICINE | Facility: CLINIC | Age: 63
End: 2023-11-17
Attending: INTERNAL MEDICINE
Payer: MEDICARE

## 2023-11-17 VITALS
OXYGEN SATURATION: 97 % | WEIGHT: 203 LBS | HEIGHT: 72 IN | HEART RATE: 48 BPM | SYSTOLIC BLOOD PRESSURE: 110 MMHG | BODY MASS INDEX: 27.5 KG/M2 | DIASTOLIC BLOOD PRESSURE: 70 MMHG

## 2023-11-17 DIAGNOSIS — D50.8 OTHER IRON DEFICIENCY ANEMIA: ICD-10-CM

## 2023-11-17 DIAGNOSIS — E78.9 DISORDER OF LIPID METABOLISM: ICD-10-CM

## 2023-11-17 DIAGNOSIS — E55.9 VITAMIN D DEFICIENCY: ICD-10-CM

## 2023-11-17 DIAGNOSIS — E78.9 DISORDER OF LIPID METABOLISM: Primary | ICD-10-CM

## 2023-11-17 DIAGNOSIS — D51.0 PERNICIOUS ANEMIA: ICD-10-CM

## 2023-11-17 DIAGNOSIS — R35.0 URINARY FREQUENCY: ICD-10-CM

## 2023-11-17 DIAGNOSIS — R79.89 OTHER SPECIFIED ABNORMAL FINDINGS OF BLOOD CHEMISTRY: ICD-10-CM

## 2023-11-17 DIAGNOSIS — I10 ESSENTIAL HYPERTENSION: Primary | ICD-10-CM

## 2023-11-17 DIAGNOSIS — E03.9 HYPOTHYROIDISM, UNSPECIFIED TYPE: ICD-10-CM

## 2023-11-17 DIAGNOSIS — R97.20 ELEVATED PSA: ICD-10-CM

## 2023-11-17 DIAGNOSIS — E78.00 HIGH CHOLESTEROL: ICD-10-CM

## 2023-11-17 DIAGNOSIS — Z12.5 SCREENING FOR PROSTATE CANCER: ICD-10-CM

## 2023-11-17 DIAGNOSIS — J45.20 MILD INTERMITTENT ASTHMA WITHOUT COMPLICATION: ICD-10-CM

## 2023-11-17 PROBLEM — M19.011 DJD OF RIGHT SHOULDER: Status: RESOLVED | Noted: 2021-06-03 | Resolved: 2023-11-17

## 2023-11-17 LAB
25(OH)D3+25(OH)D2 SERPL-MCNC: 53 NG/ML (ref 30–96)
ALBUMIN SERPL BCP-MCNC: 3.9 G/DL (ref 3.5–5.2)
ALP SERPL-CCNC: 65 U/L (ref 55–135)
ALT SERPL W/O P-5'-P-CCNC: 31 U/L (ref 10–44)
ANION GAP SERPL CALC-SCNC: 10 MMOL/L (ref 8–16)
AST SERPL-CCNC: 24 U/L (ref 10–40)
BASOPHILS # BLD AUTO: 0.04 K/UL (ref 0–0.2)
BASOPHILS NFR BLD: 0.6 % (ref 0–1.9)
BILIRUB SERPL-MCNC: 0.6 MG/DL (ref 0.1–1)
BUN SERPL-MCNC: 29 MG/DL (ref 8–23)
CALCIUM SERPL-MCNC: 9.6 MG/DL (ref 8.7–10.5)
CHLORIDE SERPL-SCNC: 101 MMOL/L (ref 95–110)
CHOLEST SERPL-MCNC: 183 MG/DL (ref 120–199)
CHOLEST/HDLC SERPL: 3.5 {RATIO} (ref 2–5)
CK SERPL-CCNC: 159 U/L (ref 20–200)
CO2 SERPL-SCNC: 28 MMOL/L (ref 23–29)
COMPLEXED PSA SERPL-MCNC: 4.4 NG/ML (ref 0–4)
CREAT SERPL-MCNC: 1.1 MG/DL (ref 0.5–1.4)
DIFFERENTIAL METHOD: ABNORMAL
EOSINOPHIL # BLD AUTO: 0.6 K/UL (ref 0–0.5)
EOSINOPHIL NFR BLD: 10.2 % (ref 0–8)
ERYTHROCYTE [DISTWIDTH] IN BLOOD BY AUTOMATED COUNT: 12.8 % (ref 11.5–14.5)
EST. GFR  (NO RACE VARIABLE): >60 ML/MIN/1.73 M^2
ESTIMATED AVG GLUCOSE: 108 MG/DL (ref 68–131)
GLUCOSE SERPL-MCNC: 86 MG/DL (ref 70–110)
HBA1C MFR BLD: 5.4 % (ref 4–5.6)
HCT VFR BLD AUTO: 45.1 % (ref 40–54)
HDLC SERPL-MCNC: 52 MG/DL (ref 40–75)
HDLC SERPL: 28.4 % (ref 20–50)
HGB BLD-MCNC: 15 G/DL (ref 14–18)
IMM GRANULOCYTES # BLD AUTO: 0.03 K/UL (ref 0–0.04)
IMM GRANULOCYTES NFR BLD AUTO: 0.5 % (ref 0–0.5)
LDLC SERPL CALC-MCNC: 114.4 MG/DL (ref 63–159)
LYMPHOCYTES # BLD AUTO: 1.3 K/UL (ref 1–4.8)
LYMPHOCYTES NFR BLD: 20.2 % (ref 18–48)
MCH RBC QN AUTO: 30.5 PG (ref 27–31)
MCHC RBC AUTO-ENTMCNC: 33.3 G/DL (ref 32–36)
MCV RBC AUTO: 92 FL (ref 82–98)
MONOCYTES # BLD AUTO: 0.7 K/UL (ref 0.3–1)
MONOCYTES NFR BLD: 10.7 % (ref 4–15)
NEUTROPHILS # BLD AUTO: 3.6 K/UL (ref 1.8–7.7)
NEUTROPHILS NFR BLD: 57.8 % (ref 38–73)
NONHDLC SERPL-MCNC: 131 MG/DL
NRBC BLD-RTO: 0 /100 WBC
PLATELET # BLD AUTO: 306 K/UL (ref 150–450)
PMV BLD AUTO: 9.5 FL (ref 9.2–12.9)
POTASSIUM SERPL-SCNC: 4.1 MMOL/L (ref 3.5–5.1)
PROSTATE SPECIFIC ANTIGEN, TOTAL: 5 NG/ML (ref 0–4)
PROSTATE SPECIFIC ANTIGEN, TOTAL: 5 NG/ML (ref 0–4)
PROT SERPL-MCNC: 7 G/DL (ref 6–8.4)
PSA FREE MFR SERPL: 21.6 %
PSA FREE MFR SERPL: 21.6 %
PSA FREE SERPL-MCNC: 1.08 NG/ML (ref 0–1.5)
PSA FREE SERPL-MCNC: 1.08 NG/ML (ref 0–1.5)
RBC # BLD AUTO: 4.91 M/UL (ref 4.6–6.2)
SODIUM SERPL-SCNC: 139 MMOL/L (ref 136–145)
TRIGL SERPL-MCNC: 83 MG/DL (ref 30–150)
TSH SERPL DL<=0.005 MIU/L-ACNC: 1.61 UIU/ML (ref 0.4–4)
VIT B12 SERPL-MCNC: 645 PG/ML (ref 210–950)
WBC # BLD AUTO: 6.29 K/UL (ref 3.9–12.7)

## 2023-11-17 PROCEDURE — G0008 ADMIN INFLUENZA VIRUS VAC: HCPCS | Mod: S$GLB,,, | Performed by: INTERNAL MEDICINE

## 2023-11-17 PROCEDURE — 90694 FLU VACCINE - QUADRIVALENT - ADJUVANTED: ICD-10-PCS | Mod: S$GLB,,, | Performed by: INTERNAL MEDICINE

## 2023-11-17 PROCEDURE — G0008 FLU VACCINE - QUADRIVALENT - ADJUVANTED: ICD-10-PCS | Mod: S$GLB,,, | Performed by: INTERNAL MEDICINE

## 2023-11-17 PROCEDURE — 90694 VACC AIIV4 NO PRSRV 0.5ML IM: CPT | Mod: S$GLB,,, | Performed by: INTERNAL MEDICINE

## 2023-11-17 PROCEDURE — 80061 LIPID PANEL: CPT | Performed by: INTERNAL MEDICINE

## 2023-11-17 PROCEDURE — 99214 OFFICE O/P EST MOD 30 MIN: CPT | Mod: S$GLB,,, | Performed by: INTERNAL MEDICINE

## 2023-11-17 PROCEDURE — 82550 ASSAY OF CK (CPK): CPT | Performed by: INTERNAL MEDICINE

## 2023-11-17 PROCEDURE — 83036 HEMOGLOBIN GLYCOSYLATED A1C: CPT | Performed by: INTERNAL MEDICINE

## 2023-11-17 PROCEDURE — 82306 VITAMIN D 25 HYDROXY: CPT | Performed by: INTERNAL MEDICINE

## 2023-11-17 PROCEDURE — 84443 ASSAY THYROID STIM HORMONE: CPT | Performed by: INTERNAL MEDICINE

## 2023-11-17 PROCEDURE — 99214 PR OFFICE/OUTPT VISIT, EST, LEVL IV, 30-39 MIN: ICD-10-PCS | Mod: S$GLB,,, | Performed by: INTERNAL MEDICINE

## 2023-11-17 PROCEDURE — 82607 VITAMIN B-12: CPT | Performed by: INTERNAL MEDICINE

## 2023-11-17 PROCEDURE — 84153 ASSAY OF PSA TOTAL: CPT | Performed by: INTERNAL MEDICINE

## 2023-11-17 PROCEDURE — 36415 COLL VENOUS BLD VENIPUNCTURE: CPT | Performed by: INTERNAL MEDICINE

## 2023-11-17 PROCEDURE — 84153 ASSAY OF PSA TOTAL: CPT | Performed by: NURSE PRACTITIONER

## 2023-11-17 PROCEDURE — 80053 COMPREHEN METABOLIC PANEL: CPT | Performed by: INTERNAL MEDICINE

## 2023-11-17 PROCEDURE — 85025 COMPLETE CBC W/AUTO DIFF WBC: CPT | Performed by: INTERNAL MEDICINE

## 2023-11-17 RX ORDER — TRIAMTERENE AND HYDROCHLOROTHIAZIDE 75; 50 MG/1; MG/1
0.5 TABLET ORAL DAILY
Qty: 90 TABLET | Refills: 1
Start: 2023-11-17 | End: 2024-02-20

## 2023-11-17 NOTE — PROGRESS NOTES
Hx of lupus , edema noted to generalized body,intermittent SOB, and vomiting this AM. Subjective     Patient ID: Jerrell Bay IV is a 63 y.o. male.    Chief Complaint: Annual Exam and Hypertension    He wants to consider cutting back on his diuretic because he urinates a lot.    Hypertension  This is a chronic problem. The current episode started more than 1 year ago. The problem is controlled.     Review of Systems   Constitutional: Negative.    Respiratory: Negative.     Cardiovascular: Negative.    Genitourinary:  Positive for frequency.          Objective     Physical Exam  Vitals and nursing note reviewed.   Constitutional:       Appearance: He is well-developed.   HENT:      Head: Normocephalic and atraumatic.   Eyes:      Pupils: Pupils are equal, round, and reactive to light.   Cardiovascular:      Rate and Rhythm: Normal rate and regular rhythm.      Heart sounds: Normal heart sounds.   Pulmonary:      Effort: Pulmonary effort is normal.   Neurological:      Mental Status: He is alert.            Assessment and Plan     1. Essential hypertension  Overview:  About 2000    Orders:  -     triamterene-hydrochlorothiazide 75-50 mg (MAXZIDE) 75-50 mg per tablet; Take 0.5 tablets by mouth once daily.  Dispense: 90 tablet; Refill: 1    2. Mild intermittent asthma without complication    3. High cholesterol    Other orders  -     Influenza - Quadrivalent (Adjuvanted)        PLAN:  Per orders and D/C instructions.  Continue diet and/or meds for Asthma, HTN, and high cholesterol, which are stable.   Cut back Maxzide to 1/2 tablet daily to reduce urinary frequency.  Check routine labs.  Flu shot today.    Between 30 and 39 min of total time for evaluation and management services were spent on the patient today.  The medical problems and treatment options were discussed, and all questions were answered.         Follow up in about 6 months (around 5/17/2024).

## 2023-11-20 ENCOUNTER — TELEPHONE (OUTPATIENT)
Dept: UROLOGY | Facility: CLINIC | Age: 63
End: 2023-11-20
Payer: MEDICARE

## 2023-11-20 DIAGNOSIS — R97.20 ELEVATED PSA: Primary | ICD-10-CM

## 2023-11-20 NOTE — TELEPHONE ENCOUNTER
----- Message from Carey Alatorre NP sent at 11/20/2023 11:55 AM CST -----  BMP and MRI prostate. Follow up with me after imaging. Thanks

## 2023-11-28 RX ORDER — AMLODIPINE BESYLATE 2.5 MG/1
2.5 TABLET ORAL DAILY
Qty: 90 TABLET | Refills: 3 | Status: SHIPPED | OUTPATIENT
Start: 2023-11-28 | End: 2024-02-20

## 2023-12-06 ENCOUNTER — OFFICE VISIT (OUTPATIENT)
Dept: UROLOGY | Facility: CLINIC | Age: 63
End: 2023-12-06
Payer: MEDICARE

## 2023-12-06 VITALS
WEIGHT: 203.06 LBS | HEIGHT: 72 IN | HEART RATE: 64 BPM | SYSTOLIC BLOOD PRESSURE: 144 MMHG | DIASTOLIC BLOOD PRESSURE: 78 MMHG | OXYGEN SATURATION: 98 % | BODY MASS INDEX: 27.5 KG/M2

## 2023-12-06 DIAGNOSIS — R97.20 ELEVATED PSA: Primary | ICD-10-CM

## 2023-12-06 PROCEDURE — 99213 PR OFFICE/OUTPT VISIT, EST, LEVL III, 20-29 MIN: ICD-10-PCS | Mod: S$GLB,,, | Performed by: NURSE PRACTITIONER

## 2023-12-06 PROCEDURE — 99213 OFFICE O/P EST LOW 20 MIN: CPT | Mod: S$GLB,,, | Performed by: NURSE PRACTITIONER

## 2023-12-06 NOTE — PROGRESS NOTES
"Subjective:      Jerrell Bay IV is a 63 y.o. male who returns today regarding his labile PSA.    The patient has a history of elevated PSA.  Component      Latest Ref Rng 11/15/2022 6/19/2023 11/17/2023   PSA Total      0.00 - 4.00 ng/mL 6.0 (H)  4.2 (H)  5.0 (H)    PSA Total      0.00 - 4.00 ng/mL   5.0 (H)    PSA, Free      0.00 - 1.50 ng/mL 1.23  1.09  1.08    PSA, Free      0.00 - 1.50 ng/mL   1.08    PSA, Free %      Not established % 20.50  25.95  21.60    PSA, Free %      Not established %   21.60    Prostate Specific Antigen      0.00 - 4.00 ng/mL   4.4 (H)       Prostate MRI in July 2021 demonstrated "PIRADS 2: BPH.  No focal lesions suspicious for clinically significant cancer.."  Prostate biopsy was cancelled as PSA density was within the normal range.      Denies bothersome urinary symptoms. Symptoms have improved with the reduced dosage of HCTZ.   Denies a family history of prostate cancer.     The following portions of the patient's history were reviewed and updated as appropriate: allergies, current medications, past family history, past medical history, past social history, past surgical history and problem list.    Review of Systems  Constitutional: no fever or chills  ENT: no nasal congestion or sore throat  Respiratory: no cough or shortness of breath  Cardiovascular: no chest pain or palpitations  Gastrointestinal: no nausea or vomiting, tolerating diet  Genitourinary: as per HPI  Hematologic/Lymphatic: no easy bruising or lymphadenopathy  Musculoskeletal: no arthralgias or myalgias  Neurological: no seizures or tremors  Behavioral/Psych: no auditory or visual hallucinations     Objective:   Vitals: BP (!) 144/78 (BP Location: Left arm, Patient Position: Sitting, BP Method: Large (Automatic))   Pulse 64   Ht 6' (1.829 m)   Wt 92.1 kg (203 lb 0.7 oz)   SpO2 98%   BMI 27.54 kg/m²     Physical Exam   General: alert and oriented, no acute distress  Head: normocephalic, atraumatic  Neck: " supple, normal ROM  Respiratory: Symmetric expansion, non-labored breathing  Cardiovascular: regular rate and rhythm  Abdomen: soft, non tender, non distended  Genitourinary: deferred  Skin: normal coloration and turgor, no rashes, no suspicious skin lesions noted  Neuro: alert and oriented x3, no gross deficits  Psych: normal judgment and insight, normal mood/affect, and non-anxious    Lab Review   Urinalysis demonstrates : negative for all components   Lab Results   Component Value Date    WBC 6.29 11/17/2023    HGB 15.0 11/17/2023    HCT 45.1 11/17/2023    MCV 92 11/17/2023     11/17/2023     Lab Results   Component Value Date    CREATININE 1.1 11/17/2023    BUN 29 (H) 11/17/2023     Lab Results   Component Value Date    PSA 4.4 (H) 11/17/2023     Imaging   None    Assessment:     1. Elevated PSA      Plan:   Jerrell was seen today for discuss results.    Diagnoses and all orders for this visit:    Elevated PSA    Plan:  --UA negative for infection   --Discussed PSA results   --Proceed with MRI prostate as scheduled

## 2023-12-28 ENCOUNTER — HOSPITAL ENCOUNTER (OUTPATIENT)
Dept: RADIOLOGY | Facility: HOSPITAL | Age: 63
Discharge: HOME | End: 2023-12-28
Attending: NURSE PRACTITIONER
Payer: MEDICARE

## 2023-12-28 DIAGNOSIS — R97.20 ELEVATED PSA: ICD-10-CM

## 2023-12-28 PROCEDURE — 72197 MRI PELVIS W/O & W/DYE: CPT | Mod: 26,,, | Performed by: STUDENT IN AN ORGANIZED HEALTH CARE EDUCATION/TRAINING PROGRAM

## 2023-12-28 PROCEDURE — A9585 GADOBUTROL INJECTION: HCPCS | Performed by: NURSE PRACTITIONER

## 2023-12-28 PROCEDURE — 72197 MRI PROSTATE W W/O CONTRAST: ICD-10-PCS | Mod: 26,,, | Performed by: STUDENT IN AN ORGANIZED HEALTH CARE EDUCATION/TRAINING PROGRAM

## 2023-12-28 PROCEDURE — 72197 MRI PELVIS W/O & W/DYE: CPT | Mod: TC

## 2023-12-28 PROCEDURE — 25500020 PHARM REV CODE 255: Performed by: NURSE PRACTITIONER

## 2023-12-28 RX ORDER — GADOBUTROL 604.72 MG/ML
10 INJECTION INTRAVENOUS
Status: COMPLETED | OUTPATIENT
Start: 2023-12-28 | End: 2023-12-28

## 2023-12-28 RX ADMIN — GADOBUTROL 10 ML: 604.72 INJECTION INTRAVENOUS at 08:12

## 2024-01-02 ENCOUNTER — OFFICE VISIT (OUTPATIENT)
Dept: UROLOGY | Facility: CLINIC | Age: 64
End: 2024-01-02
Payer: MEDICARE

## 2024-01-02 DIAGNOSIS — R97.20 ELEVATED PSA: Primary | ICD-10-CM

## 2024-01-02 PROCEDURE — 99213 OFFICE O/P EST LOW 20 MIN: CPT | Mod: 95,,, | Performed by: NURSE PRACTITIONER

## 2024-01-02 RX ORDER — CIPROFLOXACIN 500 MG/1
500 TABLET ORAL 2 TIMES DAILY
Qty: 4 TABLET | Refills: 0 | Status: SHIPPED | OUTPATIENT
Start: 2024-01-02 | End: 2024-01-04

## 2024-01-02 NOTE — PROGRESS NOTES
"Subjective:      Jerrell Bay IV is a 63 y.o. male who returns today regarding his prostate MRI.     The patient has a history of elevated/labile PSA.     Prostate MRI in July 2021 demonstrated "PIRADS 2: BPH.  No focal lesions suspicious for clinically significant cancer.."  Prostate biopsy was cancelled as PSA density was within the normal range.     Component      Latest Ref Rng 6/19/2023 11/17/2023   PSA Total      0.00 - 4.00 ng/mL 4.2 (H)  5.0 (H)    PSA Total      0.00 - 4.00 ng/mL  5.0 (H)    PSA, Free      0.00 - 1.50 ng/mL 1.09  1.08    PSA, Free      0.00 - 1.50 ng/mL  1.08    PSA, Free %      Not established % 25.95  21.60    PSA, Free %      Not established %  21.60    Prostate Specific Antigen      0.00 - 4.00 ng/mL  4.4 (H)       Returns today with repeat prostate MRI.     Denies bothersome urinary symptoms. Symptoms have improved with the reduced dosage of HCTZ.   Denies a family history of prostate cancer.     The following portions of the patient's history were reviewed and updated as appropriate: allergies, current medications, past family history, past medical history, past social history, past surgical history and problem list.    Review of Systems  Constitutional: no fever or chills  ENT: no nasal congestion or sore throat  Respiratory: no cough or shortness of breath  Cardiovascular: no chest pain or palpitations  Gastrointestinal: no nausea or vomiting, tolerating diet  Genitourinary: as per HPI  Hematologic/Lymphatic: no easy bruising or lymphadenopathy  Musculoskeletal: no arthralgias or myalgias  Neurological: no seizures or tremors  Behavioral/Psych: no auditory or visual hallucinations     Objective:   Vitals: There were no vitals taken for this visit. (Virtual visit)     Physical Exam   Deferred VV    Lab Review   Urinalysis demonstrates : no specimen  Lab Results   Component Value Date    WBC 6.29 11/17/2023    HGB 15.0 11/17/2023    HCT 45.1 11/17/2023    MCV 92 11/17/2023    PLT " 306 11/17/2023     Lab Results   Component Value Date    CREATININE 1.1 11/17/2023    BUN 29 (H) 11/17/2023     Lab Results   Component Value Date    PSA 4.4 (H) 11/17/2023     Imaging   (all images personally reviewed; agree with report below)  Prostate MRI  COMPARISON:  MRI prostate 07/06/2021  FINDINGS:  Previous biopsy: N/A  PSA: 4.4 ng/mL (11/17/2023)  Prior therapy: None  Prostate: 4.5 x 4.3 x 6.2 cm corresponding to a computed volume of 48.2 cc.  Evaluation mildly limited due to artifacts from left hip arthroplasty.  Peripheral zone: Diffuse mild T2 hypointensity suggestive of chronic prostatitis, score 2.  Lesion (DAMIAN) #P-1  Location: Side: right; Region: apex; Zone: posterior peripheral zone medially  Greatest dimension: 1.3 cm  T2-WI: Heterogeneous signal intensity or non-circumscribed, rounded, moderate hypointensity, score 3.  DWI/ADC: Focal (discrete and different from the background) hypointense on ADC and/or focal hyperintense on high b-value DWI; may be markedly hypointense on ADC or markedly hyperintense on high b-value DWI, but not both, score 3.  DCE: Negative  Extraprostatic extension: Negative  PI-RADS assessment category: 3  Transitional zone: Benign prostatic hyperplasia without focal suspicious abnormality, score 2.  Neurovascular bundle: Normal appearance.  Seminal vesicles: Normal appearance.  Adjacent Organ Involvement: No evidence for urinary bladder or rectal invasion.  Lymphadenopathy: Similar prominent perirectal lymph node measuring 6 mm (axial series 6, image 28).  Other Findings: Partially visualized left hip arthroplasty.  Mildly heterogenous marrow signal, unchanged from comparison MRI.  Impression:  1. PI-RADS 3 lesion in the posterior peripheral zone medially at the apex.  2. Benign prostatic hyperplasia.  3. Chronic prostatitis.  Overall Assessment: PI-RADS 3 - Intermediate (the presence of clinically significant cancer is equivocal)  Number of targets created for potential  "MR/US fusion biopsy  Peripheral zone: 1  Transition zone: 0"  Assessment:     1. Elevated PSA      Plan:   Diagnoses and all orders for this visit:    Elevated PSA  -     Transrectal Ultrasound w/ Biopsy; Future  -     ciprofloxacin HCl (CIPRO) 500 MG tablet; Take 1 tablet (500 mg total) by mouth 2 (two) times daily. Begin taking the day before the biopsy for 4 doses  -     Urine culture; Standing    Plan:  --Discussed MRI findings- PIRADS 3 lesion noted  --Will proceed with uronav bx with Dr. Us next available (pt prefers to wait until the end of February to have this done)     The patient location is: home  The chief complaint leading to consultation is: prostate MRI results    Visit type: audiovisual    Face to Face time with patient: 10 min  20 minutes of total time spent on the encounter, which includes face to face time and non-face to face time preparing to see the patient (eg, review of tests), Obtaining and/or reviewing separately obtained history, Documenting clinical information in the electronic or other health record, Independently interpreting results (not separately reported) and communicating results to the patient/family/caregiver, or Care coordination (not separately reported).     Each patient to whom he or she provides medical services by telemedicine is:  (1) informed of the relationship between the physician and patient and the respective role of any other health care provider with respect to management of the patient; and (2) notified that he or she may decline to receive medical services by telemedicine and may withdraw from such care at any time.    Notes: see above       "

## 2024-01-02 NOTE — Clinical Note
Uronav biopsy with Dr. Us- pt requests the end of February if possible. Standing order for urine culture about 1 week before the biopsy. Thanks

## 2024-01-20 DIAGNOSIS — J45.20 MILD INTERMITTENT ASTHMA WITHOUT COMPLICATION: ICD-10-CM

## 2024-01-21 RX ORDER — BUDESONIDE AND FORMOTEROL FUMARATE DIHYDRATE 80; 4.5 UG/1; UG/1
2 AEROSOL RESPIRATORY (INHALATION) 2 TIMES DAILY PRN
Qty: 10.2 G | Refills: 1 | Status: SHIPPED | OUTPATIENT
Start: 2024-01-21

## 2024-02-19 ENCOUNTER — PATIENT MESSAGE (OUTPATIENT)
Dept: UROLOGY | Facility: CLINIC | Age: 64
End: 2024-02-19
Payer: MEDICARE

## 2024-02-20 DIAGNOSIS — I10 ESSENTIAL HYPERTENSION: ICD-10-CM

## 2024-02-20 RX ORDER — TRIAMTERENE AND HYDROCHLOROTHIAZIDE 75; 50 MG/1; MG/1
1 TABLET ORAL
Qty: 90 TABLET | Refills: 2 | Status: SHIPPED | OUTPATIENT
Start: 2024-02-20

## 2024-02-20 RX ORDER — AMLODIPINE BESYLATE 2.5 MG/1
2.5 TABLET ORAL DAILY
Qty: 90 TABLET | Refills: 3 | Status: SHIPPED | OUTPATIENT
Start: 2024-02-20 | End: 2025-02-19

## 2024-02-22 ENCOUNTER — TELEPHONE (OUTPATIENT)
Dept: UROLOGY | Facility: CLINIC | Age: 64
End: 2024-02-22
Payer: MEDICARE

## 2024-03-01 DIAGNOSIS — E78.00 HIGH CHOLESTEROL: ICD-10-CM

## 2024-03-01 RX ORDER — ROSUVASTATIN CALCIUM 10 MG/1
10 TABLET, COATED ORAL DAILY
Qty: 90 TABLET | Refills: 3 | Status: SHIPPED | OUTPATIENT
Start: 2024-03-01 | End: 2024-05-16

## 2024-03-05 DIAGNOSIS — F41.9 ANXIETY: Primary | ICD-10-CM

## 2024-03-05 RX ORDER — DIAZEPAM 5 MG/1
5 TABLET ORAL ONCE
Qty: 1 TABLET | Refills: 0 | Status: SHIPPED | OUTPATIENT
Start: 2024-03-05 | End: 2024-03-05

## 2024-03-06 ENCOUNTER — LAB VISIT (OUTPATIENT)
Dept: LAB | Facility: HOSPITAL | Age: 64
End: 2024-03-06
Payer: MEDICARE

## 2024-03-06 DIAGNOSIS — R97.20 ELEVATED PSA: ICD-10-CM

## 2024-03-06 PROCEDURE — 87086 URINE CULTURE/COLONY COUNT: CPT | Performed by: NURSE PRACTITIONER

## 2024-03-07 LAB — BACTERIA UR CULT: NO GROWTH

## 2024-03-08 ENCOUNTER — PROCEDURE VISIT (OUTPATIENT)
Dept: UROLOGY | Facility: CLINIC | Age: 64
End: 2024-03-08
Payer: MEDICARE

## 2024-03-08 VITALS
HEART RATE: 69 BPM | SYSTOLIC BLOOD PRESSURE: 169 MMHG | WEIGHT: 202.38 LBS | TEMPERATURE: 97 F | BODY MASS INDEX: 27.45 KG/M2 | RESPIRATION RATE: 17 BRPM | DIASTOLIC BLOOD PRESSURE: 88 MMHG

## 2024-03-08 DIAGNOSIS — R97.20 ELEVATED PSA: ICD-10-CM

## 2024-03-08 PROCEDURE — 88344 IMHCHEM/IMCYTCHM EA MLT ANTB: CPT | Mod: 26,,, | Performed by: PATHOLOGY

## 2024-03-08 PROCEDURE — 99999PBSHW PR PBB SHADOW TECHNICAL ONLY FILED TO HB: Mod: PBBFAC,,,

## 2024-03-08 PROCEDURE — 55700 TRANSRECTAL ULTRASOUND W/ BIOPSY: CPT | Mod: PBBFAC | Performed by: UROLOGY

## 2024-03-08 PROCEDURE — 88305 TISSUE EXAM BY PATHOLOGIST: CPT | Performed by: PATHOLOGY

## 2024-03-08 PROCEDURE — G0416 PROSTATE BIOPSY, ANY MTHD: HCPCS | Mod: 26,,, | Performed by: PATHOLOGY

## 2024-03-08 PROCEDURE — 88344 IMHCHEM/IMCYTCHM EA MLT ANTB: CPT | Performed by: PATHOLOGY

## 2024-03-08 PROCEDURE — 55700 TRANSRECTAL ULTRASOUND W/ BIOPSY: CPT | Mod: S$PBB,,, | Performed by: UROLOGY

## 2024-03-08 PROCEDURE — 76942 ECHO GUIDE FOR BIOPSY: CPT | Mod: 26,S$PBB,, | Performed by: UROLOGY

## 2024-03-08 PROCEDURE — 96372 THER/PROPH/DIAG INJ SC/IM: CPT | Mod: PBBFAC,59

## 2024-03-08 PROCEDURE — 76872 US TRANSRECTAL: CPT | Mod: PBBFAC | Performed by: UROLOGY

## 2024-03-08 RX ORDER — CEFTRIAXONE 1 G/1
1 INJECTION, POWDER, FOR SOLUTION INTRAMUSCULAR; INTRAVENOUS
Status: COMPLETED | OUTPATIENT
Start: 2024-03-08 | End: 2024-03-08

## 2024-03-08 RX ORDER — LIDOCAINE HYDROCHLORIDE 10 MG/ML
20 INJECTION INFILTRATION; PERINEURAL
Status: COMPLETED | OUTPATIENT
Start: 2024-03-08 | End: 2024-03-08

## 2024-03-08 RX ORDER — LIDOCAINE HYDROCHLORIDE 20 MG/ML
JELLY TOPICAL
Status: COMPLETED | OUTPATIENT
Start: 2024-03-08 | End: 2024-03-08

## 2024-03-08 RX ADMIN — CEFTRIAXONE SODIUM 1 G: 1 INJECTION, POWDER, FOR SOLUTION INTRAMUSCULAR; INTRAVENOUS at 02:03

## 2024-03-08 RX ADMIN — LIDOCAINE HYDROCHLORIDE: 20 JELLY TOPICAL at 02:03

## 2024-03-08 RX ADMIN — LIDOCAINE HYDROCHLORIDE 20 ML: 10 INJECTION, SOLUTION INFILTRATION; PERINEURAL at 02:03

## 2024-03-08 NOTE — PROCEDURES
"Transrectal Ultrasound w/ Biopsy    Date/Time: 3/8/2024 2:28 PM    Performed by: Bandar Us MD  Authorized by: Carey Alatorre NP    Consent Done?:  Yes (Written)  Time out: Immediately prior to procedure a "time out" was called to verify the correct patient, procedure, equipment, support staff and site/side marked as required.    Indications: Prostate Nodules and Elevated PSA    Preparation: Patient was prepped and draped in usual sterile fashion    Anesthesia:  Lidocaine jelly and Pudendal nerve block  Patient sedated: No    Lesions:: No    Left Base Biopsies: 2  Left Mid Biopsies: 2  Left Cortez Biopsies: 2  Right Base Biopsies: 2  Right Mid Biopsies: 2  Right Cortez Biopsies: 2  Total Biopsies:  12    Patient tolerance:  Patient tolerated the procedure well with no immediate complications     MR and US imaged segmented and co-registered with uronav    3 additional cores taken from target for total of 15 cores    Urine cs neg  cipro and enemas and Rocephin done      Standard instructions given  RTC to discuss pathology results 3/18 at 1145am          "

## 2024-03-13 DIAGNOSIS — R97.20 ELEVATED PSA: Primary | ICD-10-CM

## 2024-03-13 LAB
FINAL PATHOLOGIC DIAGNOSIS: NORMAL
GROSS: NORMAL
Lab: NORMAL

## 2024-03-14 ENCOUNTER — PATIENT MESSAGE (OUTPATIENT)
Dept: UROLOGY | Facility: CLINIC | Age: 64
End: 2024-03-14
Payer: MEDICARE

## 2024-04-17 DIAGNOSIS — N52.01 ERECTILE DYSFUNCTION DUE TO ARTERIAL INSUFFICIENCY: ICD-10-CM

## 2024-04-17 RX ORDER — SILDENAFIL CITRATE 20 MG/1
20 TABLET ORAL DAILY PRN
Qty: 30 TABLET | Refills: 11 | Status: SHIPPED | OUTPATIENT
Start: 2024-04-17

## 2024-05-16 DIAGNOSIS — E78.00 HIGH CHOLESTEROL: ICD-10-CM

## 2024-05-16 RX ORDER — ROSUVASTATIN CALCIUM 10 MG/1
10 TABLET, COATED ORAL DAILY
Qty: 90 TABLET | Refills: 1 | Status: SHIPPED | OUTPATIENT
Start: 2024-05-16

## 2024-07-18 ENCOUNTER — PATIENT OUTREACH (OUTPATIENT)
Dept: ADMINISTRATIVE | Facility: HOSPITAL | Age: 64
End: 2024-07-18
Payer: MEDICARE

## 2024-08-13 RX ORDER — IRBESARTAN 300 MG/1
300 TABLET ORAL NIGHTLY
Qty: 90 TABLET | Refills: 3 | Status: SHIPPED | OUTPATIENT
Start: 2024-08-13 | End: 2025-08-13

## 2024-11-06 DIAGNOSIS — E78.00 HIGH CHOLESTEROL: ICD-10-CM

## 2024-11-06 RX ORDER — ROSUVASTATIN CALCIUM 10 MG/1
10 TABLET, COATED ORAL DAILY
Qty: 90 TABLET | Refills: 0 | Status: SHIPPED | OUTPATIENT
Start: 2024-11-06

## 2024-11-21 ENCOUNTER — OFFICE VISIT (OUTPATIENT)
Dept: INTERNAL MEDICINE | Facility: CLINIC | Age: 64
End: 2024-11-21
Attending: INTERNAL MEDICINE
Payer: MEDICARE

## 2024-11-21 ENCOUNTER — LAB VISIT (OUTPATIENT)
Dept: LAB | Facility: OTHER | Age: 64
End: 2024-11-21
Attending: INTERNAL MEDICINE
Payer: MEDICARE

## 2024-11-21 VITALS
SYSTOLIC BLOOD PRESSURE: 122 MMHG | WEIGHT: 199 LBS | BODY MASS INDEX: 26.95 KG/M2 | HEART RATE: 52 BPM | HEIGHT: 72 IN | OXYGEN SATURATION: 96 % | DIASTOLIC BLOOD PRESSURE: 74 MMHG

## 2024-11-21 DIAGNOSIS — D50.8 OTHER IRON DEFICIENCY ANEMIA: ICD-10-CM

## 2024-11-21 DIAGNOSIS — E55.9 VITAMIN D DEFICIENCY: ICD-10-CM

## 2024-11-21 DIAGNOSIS — R79.89 OTHER SPECIFIED ABNORMAL FINDINGS OF BLOOD CHEMISTRY: ICD-10-CM

## 2024-11-21 DIAGNOSIS — E03.9 HYPOTHYROIDISM, UNSPECIFIED TYPE: ICD-10-CM

## 2024-11-21 DIAGNOSIS — R53.83 FATIGUE, UNSPECIFIED TYPE: ICD-10-CM

## 2024-11-21 DIAGNOSIS — R97.20 ELEVATED PSA: Primary | ICD-10-CM

## 2024-11-21 DIAGNOSIS — E78.9 DISORDER OF LIPID METABOLISM: ICD-10-CM

## 2024-11-21 DIAGNOSIS — R97.20 ELEVATED PSA: ICD-10-CM

## 2024-11-21 DIAGNOSIS — I10 ESSENTIAL HYPERTENSION: Primary | ICD-10-CM

## 2024-11-21 DIAGNOSIS — D51.0 PERNICIOUS ANEMIA: ICD-10-CM

## 2024-11-21 DIAGNOSIS — Z00.01 ENCOUNTER FOR GENERAL ADULT MEDICAL EXAMINATION WITH ABNORMAL FINDINGS: ICD-10-CM

## 2024-11-21 DIAGNOSIS — Z12.5 SCREENING FOR PROSTATE CANCER: ICD-10-CM

## 2024-11-21 DIAGNOSIS — E78.00 HIGH CHOLESTEROL: ICD-10-CM

## 2024-11-21 DIAGNOSIS — J45.20 MILD INTERMITTENT ASTHMA WITHOUT COMPLICATION: ICD-10-CM

## 2024-11-21 LAB
25(OH)D3+25(OH)D2 SERPL-MCNC: 49 NG/ML (ref 30–96)
ALBUMIN SERPL BCP-MCNC: 3.8 G/DL (ref 3.5–5.2)
ALP SERPL-CCNC: 67 U/L (ref 40–150)
ALT SERPL W/O P-5'-P-CCNC: 29 U/L (ref 10–44)
ANION GAP SERPL CALC-SCNC: 8 MMOL/L (ref 8–16)
AST SERPL-CCNC: 24 U/L (ref 10–40)
BASOPHILS # BLD AUTO: 0.02 K/UL (ref 0–0.2)
BASOPHILS NFR BLD: 0.3 % (ref 0–1.9)
BILIRUB SERPL-MCNC: 0.5 MG/DL (ref 0.1–1)
BUN SERPL-MCNC: 20 MG/DL (ref 8–23)
CALCIUM SERPL-MCNC: 9.7 MG/DL (ref 8.7–10.5)
CHLORIDE SERPL-SCNC: 103 MMOL/L (ref 95–110)
CHOLEST SERPL-MCNC: 184 MG/DL (ref 120–199)
CHOLEST/HDLC SERPL: 4.3 {RATIO} (ref 2–5)
CO2 SERPL-SCNC: 30 MMOL/L (ref 23–29)
COMPLEXED PSA SERPL-MCNC: 5.5 NG/ML (ref 0–4)
CREAT SERPL-MCNC: 1 MG/DL (ref 0.5–1.4)
DIFFERENTIAL METHOD BLD: ABNORMAL
EOSINOPHIL # BLD AUTO: 0.3 K/UL (ref 0–0.5)
EOSINOPHIL NFR BLD: 4.6 % (ref 0–8)
ERYTHROCYTE [DISTWIDTH] IN BLOOD BY AUTOMATED COUNT: 12.4 % (ref 11.5–14.5)
EST. GFR  (NO RACE VARIABLE): >60 ML/MIN/1.73 M^2
ESTIMATED AVG GLUCOSE: 105 MG/DL (ref 68–131)
GLUCOSE SERPL-MCNC: 97 MG/DL (ref 70–110)
HBA1C MFR BLD: 5.3 % (ref 4–5.6)
HCT VFR BLD AUTO: 44.9 % (ref 40–54)
HDLC SERPL-MCNC: 43 MG/DL (ref 40–75)
HDLC SERPL: 23.4 % (ref 20–50)
HGB BLD-MCNC: 15.1 G/DL (ref 14–18)
IMM GRANULOCYTES # BLD AUTO: 0.02 K/UL (ref 0–0.04)
IMM GRANULOCYTES NFR BLD AUTO: 0.3 % (ref 0–0.5)
LDLC SERPL CALC-MCNC: 126.2 MG/DL (ref 63–159)
LYMPHOCYTES # BLD AUTO: 1.1 K/UL (ref 1–4.8)
LYMPHOCYTES NFR BLD: 16 % (ref 18–48)
MCH RBC QN AUTO: 31.5 PG (ref 27–31)
MCHC RBC AUTO-ENTMCNC: 33.6 G/DL (ref 32–36)
MCV RBC AUTO: 94 FL (ref 82–98)
MONOCYTES # BLD AUTO: 0.6 K/UL (ref 0.3–1)
MONOCYTES NFR BLD: 8.8 % (ref 4–15)
NEUTROPHILS # BLD AUTO: 4.9 K/UL (ref 1.8–7.7)
NEUTROPHILS NFR BLD: 70 % (ref 38–73)
NONHDLC SERPL-MCNC: 141 MG/DL
NRBC BLD-RTO: 0 /100 WBC
PLATELET # BLD AUTO: 307 K/UL (ref 150–450)
PMV BLD AUTO: 9.7 FL (ref 9.2–12.9)
POTASSIUM SERPL-SCNC: 4.9 MMOL/L (ref 3.5–5.1)
PROT SERPL-MCNC: 6.9 G/DL (ref 6–8.4)
RBC # BLD AUTO: 4.79 M/UL (ref 4.6–6.2)
SODIUM SERPL-SCNC: 141 MMOL/L (ref 136–145)
TESTOST SERPL-MCNC: 459 NG/DL (ref 304–1227)
TRIGL SERPL-MCNC: 74 MG/DL (ref 30–150)
TSH SERPL DL<=0.005 MIU/L-ACNC: 1.58 UIU/ML (ref 0.4–4)
VIT B12 SERPL-MCNC: 804 PG/ML (ref 210–950)
WBC # BLD AUTO: 7.02 K/UL (ref 3.9–12.7)

## 2024-11-21 PROCEDURE — 82607 VITAMIN B-12: CPT | Performed by: INTERNAL MEDICINE

## 2024-11-21 PROCEDURE — 36415 COLL VENOUS BLD VENIPUNCTURE: CPT | Performed by: INTERNAL MEDICINE

## 2024-11-21 PROCEDURE — 84443 ASSAY THYROID STIM HORMONE: CPT | Performed by: INTERNAL MEDICINE

## 2024-11-21 PROCEDURE — 84153 ASSAY OF PSA TOTAL: CPT | Performed by: INTERNAL MEDICINE

## 2024-11-21 PROCEDURE — 84403 ASSAY OF TOTAL TESTOSTERONE: CPT | Performed by: INTERNAL MEDICINE

## 2024-11-21 PROCEDURE — 80061 LIPID PANEL: CPT | Performed by: INTERNAL MEDICINE

## 2024-11-21 PROCEDURE — 83036 HEMOGLOBIN GLYCOSYLATED A1C: CPT | Performed by: INTERNAL MEDICINE

## 2024-11-21 PROCEDURE — 80053 COMPREHEN METABOLIC PANEL: CPT | Performed by: INTERNAL MEDICINE

## 2024-11-21 PROCEDURE — 85025 COMPLETE CBC W/AUTO DIFF WBC: CPT | Performed by: INTERNAL MEDICINE

## 2024-11-21 PROCEDURE — 82306 VITAMIN D 25 HYDROXY: CPT | Performed by: INTERNAL MEDICINE

## 2024-11-21 NOTE — PROGRESS NOTES
Subjective     Patient ID: Jerrell Bay IV is a 64 y.o. male.    Chief Complaint: Annual Exam and Hypertension    He has recently had a cough, which is improving.  He uses Symbicort as needed.  He has been off alcohol for about 18 months.  He has lost 4 pounds in the past 1 year.      Annual Wellness Visit and Health Risk Assessement (HRA)    Cognitive Impairment: None    Mental Conditions/Psychosocial Risks: None    Behavioral Risks (alcohol, tobacco, sedentary, malnutrition, etc.): See Social history    How often do you use opioids? Rarely    Functional Ability:    Steady gait: Yes  Self-reliant: Yes   Safe home: Yes  Hearing Difficulties: No   Vision Difficulties: No    Physical Impairment: None    Functional assessment:  Able to do all ADL's (including dressing, feeding, toileting, grooming, bathing, and ambulating).  Fall Risk: Low  Energy level: Good  Mental well-being: Good    Living Will: See Patient Demographics  Information on how to obtain a [employer] form is included in the patient information which prints out under Patient instructions.  I did not directly discuss advanced directives/end of life care planning with the patient unless I have included a summary of the discussion in my assessment and plan.    HEIDS Measure screening dates:  BMI: See Vital signs      DEXA:               See Health Maintenance Report  colon screen:    See Health Maintenance Report      Mammogram:   See Health Maintenance Report                   Glaucoma screen:  per Optho            A list of current providers and suppliers is in The Problem list under the relevant problem.      Vaccines: See Health Maintenance Report  Flu:            Pneumovax:  Shingrix:         The patient's current health status is: Good   Patient was educated on all medical problems. See A/P from note dated today.    A written Personal Prevention Plan (screening and health advice) was printed for the patient and is listed under Patient  Instructions.    Screening: The patient was screened for depression with the PHQ2 questionnaire and possible health consequences were discussed with the patient, who understands (15 minutes spent). The patient was screened for opioid use (the JOE screen) by asking how often do you use opioids? The patient was screened for the misuse of alcohol, by asking the number of drinks per average week, and if pt has had more than 4 drinks (more than 3 for women and elderly) in 1 day within the past year. The health and legal consequences of misuse were discussed (15 minutes spent). The patient was screened for obesity (BMI>30), If the current BMI > 30, then the possible consequences of obesity, as well as the benefits of diet, exercise, and weight loss were discussed. Any behavioral risks were identified, and methods to achieve appropriate treatment goals were discussed (15 minutes spent).                               Hypertension  This is a chronic problem. The current episode started more than 1 year ago. The problem is controlled.     Review of Systems   Constitutional: Negative.    Respiratory:  Positive for cough.    Cardiovascular: Negative.           Objective     Physical Exam  Vitals and nursing note reviewed.   Constitutional:       Appearance: He is well-developed.   HENT:      Head: Normocephalic and atraumatic.   Eyes:      Pupils: Pupils are equal, round, and reactive to light.   Cardiovascular:      Rate and Rhythm: Normal rate and regular rhythm.      Heart sounds: Normal heart sounds.   Pulmonary:      Effort: Pulmonary effort is normal.      Breath sounds: Wheezing present.      Comments: Mild expiratory wheeze bilaterally  Neurological:      Mental Status: He is alert.          Assessment and Plan     1. Essential hypertension  Overview:  About 2000      2. Mild intermittent asthma without complication    3. High cholesterol    4. Encounter for general adult medical examination with abnormal  findings        PLAN:  Per orders and D/C instructions.  Continue diet and/or meds for Asthma, HTN, and high cholesterol, which are stable.  Check routine labs, with PSA, and testosterone level per patient request.       Follow up in about 6 months (around 5/21/2025).

## 2024-11-21 NOTE — PATIENT INSTRUCTIONS
Use your Symbicort 2 puffs each day until your cough is gone.  Consider getting an RSV vaccine at the pharmacy.    Tips for Healthy Living and Routine Preventative Care - 2024                                                            (These guidelines are intended for healthy adults)      1. Exercise  Exercise aerobically with a target heart rate of (220-age) x 0.8  Exercise 30-45 minutes on most days of the week    2. Diet and Supplements- All supplements can be obtained through a varied, healthy diet   Calcium: 1,000 - 1,200 mg each day   8 oz milk or Calcium fortified O.J. = 300, 8 oz Yogurt = 400 mg, 1 oz of cheese =100-200 mg              8 oz Oatmeal = 215 mg, 3 oz Moline = 240 mg  Vitamin D: 800 iu each day- Can probably be obtained by 30 min. of direct sunlight    each day             3 oz. Moline = 800 iu,  3 oz. Tuna =150 iu, Milk or fortified O.J. = 120 iu  Fish oil: 1-2 grams each day or about 840 mg of EPA and DHA (Omega-3 fatty acids) each day             3 oz. Moline=2 grams,  3 oz. Tuna=1.3 grams,  3 oz. drained light Tuna= 0.25 grams  Folic acid 800 mcg each day for all women planning or capable of pregnancy    3. Lifestyle  Alcohol: 1 drink = 12 oz. domestic beer, 4 oz. wine, or 1 oz. hard (80 proof) liquor             Males: </= 14 drinks per week with no more than 4 in any one day             Females: </= 7 drinks per week with no more than 3 in any one day  Salt: 1.2 - 3 grams of Sodium each day.  Tobacco: Dont smoke, or quit smoking (discuss with your doctor)  Depression: If you feel depressed discuss with your doctor  Weight: Maintain a healthy body weight. Stay within 10% of:             Males: 106 lbs. + 6 lbs per inch height above 5 feet             Females: 100 lbs + 5 lbs per inch height above 5 feet    4. Routine tests  Blood pressure check at each visit, or at least once each year  HIV screening (one time) if less than 65 years old  Hepatitis C screen (one time) if less than 80 years  old  Cholesterol screening every 3 years starting at age 21  Glucose/Hemaglobin A1C check every 3 years starting at age 35.  TSH (thyroid screen) every 2 years starting at age 50  Colonoscopy at age 45, and repeat every 10 years until age 75. Consider screening until age 85. DNA stool test (Cologuard) every 3 years is an acceptable alternative.  Vision screen at age 65    Females:  Gyn exam with cervical HPV test every 3 years or Pap smear every three years starting at age 25                  Stop screening at age 65 if past 3 exams were normal                  No screening for women who have had a hysterectomy with removal of cervix  Mammogram every 1-2 years starting at age 40 until age 75  Consider continuing Mammograms every other year for those older than 75 with a life expectancy of more than 10 years  Bone density scan at about age 65    Males:  PSA screening annually at age 50, age 45 for  Americans, until age 75. Consider annual screening after age 75                   5. Immunizations  Influenza vaccine every year in the fall, especially if >50 or with a chronic disease  Consider getting a Covid booster vaccine annually  Tetanus/Diphtheria/Pertussis (Tdap) vaccine once (after the age of 18), then Tetanus/Diphtheria (Td) or Tdap vaccine every 10 years  Shingles (Shingrix) vaccine after age 50 and get a 2nd dose after 2-6 months  RSV (respiratory syncytial virus) vaccine after age 60  Pneumonia vaccine (Prevnar-20) at age 65       6. Advanced Directive/End of life care planning  You should consider having a signed document which informs physicians and family of your end of life care wishes.  You can go to [email or web address]. Under Step 1 click download AdobePDF and print.  This is the [place] physician order for scope of Treatment (LaPost) form.  You can also request a blank copy of the [employer] form from my office.  Bring a copy of the signed document to my office so we can scan it in your  medical chart.

## 2025-03-26 DIAGNOSIS — J45.20 MILD INTERMITTENT ASTHMA WITHOUT COMPLICATION: ICD-10-CM

## 2025-03-26 RX ORDER — DILTIAZEM HYDROCHLORIDE 60 MG/1
TABLET, FILM COATED ORAL
Qty: 10.2 G | Refills: 2 | Status: SHIPPED | OUTPATIENT
Start: 2025-03-26

## 2025-04-16 ENCOUNTER — TELEPHONE (OUTPATIENT)
Dept: UROLOGY | Facility: CLINIC | Age: 65
End: 2025-04-16
Payer: MEDICARE

## 2025-04-16 ENCOUNTER — OFFICE VISIT (OUTPATIENT)
Dept: UROLOGY | Facility: CLINIC | Age: 65
End: 2025-04-16
Payer: MEDICARE

## 2025-04-16 VITALS
HEART RATE: 59 BPM | WEIGHT: 199.06 LBS | DIASTOLIC BLOOD PRESSURE: 84 MMHG | RESPIRATION RATE: 18 BRPM | SYSTOLIC BLOOD PRESSURE: 146 MMHG | BODY MASS INDEX: 26.96 KG/M2 | OXYGEN SATURATION: 100 % | HEIGHT: 72 IN

## 2025-04-16 DIAGNOSIS — R39.15 URINARY URGENCY: ICD-10-CM

## 2025-04-16 DIAGNOSIS — N40.1 BPH WITH OBSTRUCTION/LOWER URINARY TRACT SYMPTOMS: Primary | ICD-10-CM

## 2025-04-16 DIAGNOSIS — R97.20 ELEVATED PSA: ICD-10-CM

## 2025-04-16 DIAGNOSIS — N13.8 BPH WITH OBSTRUCTION/LOWER URINARY TRACT SYMPTOMS: Primary | ICD-10-CM

## 2025-04-16 PROCEDURE — 99214 OFFICE O/P EST MOD 30 MIN: CPT | Mod: S$GLB,,, | Performed by: NURSE PRACTITIONER

## 2025-04-16 RX ORDER — TAMSULOSIN HYDROCHLORIDE 0.4 MG/1
0.4 CAPSULE ORAL DAILY
Qty: 30 CAPSULE | Refills: 11 | Status: SHIPPED | OUTPATIENT
Start: 2025-04-16 | End: 2026-04-16

## 2025-04-16 RX ORDER — TADALAFIL 10 MG/1
10 TABLET ORAL
Qty: 30 TABLET | Refills: 6 | Status: SHIPPED | OUTPATIENT
Start: 2025-04-16 | End: 2026-04-16

## 2025-04-16 NOTE — Clinical Note
PSA in 2 months- [facility]. Thanks!

## 2025-04-16 NOTE — TELEPHONE ENCOUNTER
----- Message from Carey Alatorre NP sent at 4/16/2025  9:24 AM CDT -----  PSA in 2 months- [facility]. Thanks!

## 2025-04-16 NOTE — PROGRESS NOTES
"Subjective:      Jerrell Bay IV is a 65 y.o. male who returns today regarding his prostate cancer screening.     The patient has a history of elevated/labile PSA.     Prostate MRI in July 2021 demonstrated "PIRADS 2: BPH.  No focal lesions suspicious for clinically significant cancer.."  Prostate biopsy was cancelled as PSA density was within the normal range.      Component      Latest Ref Rng 6/19/2023 11/17/2023 11/21/2024   PSA Total      0.00 - 4.00 ng/mL 4.2 (H)  5.0 (H)     PSA Total      0.00 - 4.00 ng/mL  5.0 (H)     Prostate Specific Antigen Free      0.00 - 1.50 ng/mL 1.09  1.08     Prostate Specific Antigen Free      0.00 - 1.50 ng/mL  1.08     PSA % Free      Not established % 25.95  21.60     PSA % Free      Not established %  21.60     Prostate Specific Antigen      0.00 - 4.00 ng/mL  4.4 (H)     PSA Diagnostic      0.00 - 4.00 ng/mL   5.5 (H)       MRI 2023- PIRADS 3  [facility]ronav TRUS/bx Dr. Us 3/8/24- benign      Reports slower stream at times. Intermittent urgency, near urge incontinence at times. Remains on HCTZ. Denies dysuria and gross hematuria.    ++ bladder irritants.     On revatio for ED. Reports less sensation at times.     The following portions of the patient's history were reviewed and updated as appropriate: allergies, current medications, past family history, past medical history, past social history, past surgical history and problem list.    Review of Systems  Constitutional: no fever or chills  ENT: no nasal congestion or sore throat  Respiratory: no cough or shortness of breath  Cardiovascular: no chest pain or palpitations  Gastrointestinal: no nausea or vomiting, tolerating diet  Genitourinary: as per HPI  Hematologic/Lymphatic: no easy bruising or lymphadenopathy  Musculoskeletal: no arthralgias or myalgias  Neurological: no seizures or tremors  Behavioral/Psych: no auditory or visual hallucinations     Objective:   Vitals: BP (!) 146/84 (BP Location: Left arm, Patient " Position: Sitting)   Pulse (!) 59   Resp 18   Ht 6' (1.829 m)   Wt 90.3 kg (199 lb 1.2 oz)   SpO2 100%   BMI 27.00 kg/m²     Physical Exam   General: alert and oriented, no acute distress  Head: normocephalic, atraumatic  Neck: supple, normal ROM  Respiratory: Symmetric expansion, non-labored breathing  Cardiovascular: regular rate and rhythm  Abdomen: soft, non tender, non distended  Genitourinary:   Prostate: normal for age, normal consistency, non tender, no specific nodules, size: 40 gm; seminal vesicles not palpated  Rectum: normal rectal tone, no rectal mass, normal perineum  Skin: normal coloration and turgor, no rashes, no suspicious skin lesions noted  Neuro: alert and oriented x3, no gross deficits  Psych: normal judgment and insight, normal mood/affect, and non-anxious    Lab Review   Urinalysis demonstrates : no specimen  Lab Results   Component Value Date    WBC 7.02 11/21/2024    HGB 15.1 11/21/2024    HCT 44.9 11/21/2024    MCV 94 11/21/2024     11/21/2024     Lab Results   Component Value Date    CREATININE 1.0 11/21/2024    BUN 20 11/21/2024     Lab Results   Component Value Date    PSA 4.4 (H) 11/17/2023     Imaging   None    Assessment:     1. BPH with obstruction/lower urinary tract symptoms    2. Elevated PSA    3. Urinary urgency      Plan:   Diagnoses and all orders for this visit:    BPH with obstruction/lower urinary tract symptoms  -     tamsulosin (FLOMAX) 0.4 mg Cap; Take 1 capsule (0.4 mg total) by mouth once daily.  -     PSA, Total and Free; Future  -     tadalafiL (CIALIS) 10 MG tablet; Take 1 tablet (10 mg total) by mouth every 48 hours as needed for Erectile Dysfunction (every 48 hours PRN).    Elevated PSA    Urinary urgency    Plan:  --Trial of flomax  --Discussed common bladder irritants such as caffeine, alcohol, citrus, and acidic and spicy foods. Encouraged to minimize in diet to reduce symptoms.  --Switch to cialis 10 mg PRN  --PSA in 2 months   --Follow up  annually

## 2025-05-03 DIAGNOSIS — E78.00 HIGH CHOLESTEROL: ICD-10-CM

## 2025-05-04 RX ORDER — AMLODIPINE BESYLATE 2.5 MG/1
2.5 TABLET ORAL DAILY
Qty: 90 TABLET | Refills: 3 | Status: SHIPPED | OUTPATIENT
Start: 2025-05-04 | End: 2026-05-04

## 2025-05-04 RX ORDER — ROSUVASTATIN CALCIUM 10 MG/1
10 TABLET, COATED ORAL DAILY
Qty: 90 TABLET | Refills: 3 | Status: SHIPPED | OUTPATIENT
Start: 2025-05-04

## 2025-05-04 RX ORDER — IRBESARTAN 300 MG/1
300 TABLET ORAL NIGHTLY
Qty: 90 TABLET | Refills: 3 | Status: SHIPPED | OUTPATIENT
Start: 2025-05-04 | End: 2026-05-04

## 2025-06-18 ENCOUNTER — LAB VISIT (OUTPATIENT)
Dept: LAB | Facility: HOSPITAL | Age: 65
End: 2025-06-18
Payer: MEDICARE

## 2025-06-18 ENCOUNTER — RESULTS FOLLOW-UP (OUTPATIENT)
Dept: UROLOGY | Facility: CLINIC | Age: 65
End: 2025-06-18

## 2025-06-18 DIAGNOSIS — N13.8 BPH WITH OBSTRUCTION/LOWER URINARY TRACT SYMPTOMS: ICD-10-CM

## 2025-06-18 DIAGNOSIS — N40.1 BPH WITH OBSTRUCTION/LOWER URINARY TRACT SYMPTOMS: ICD-10-CM

## 2025-06-18 DIAGNOSIS — R97.20 ELEVATED PSA: Primary | ICD-10-CM

## 2025-06-18 LAB
PSA FREE MFR SERPL: 17.05 %
PSA FREE SERPL-MCNC: 1.05 NG/ML
PSA SERPL-MCNC: 6.16 NG/ML

## 2025-06-18 PROCEDURE — 36415 COLL VENOUS BLD VENIPUNCTURE: CPT | Mod: PO

## 2025-06-18 PROCEDURE — 84153 ASSAY OF PSA TOTAL: CPT

## 2025-06-19 ENCOUNTER — TELEPHONE (OUTPATIENT)
Dept: UROLOGY | Facility: CLINIC | Age: 65
End: 2025-06-19
Payer: MEDICARE

## 2025-06-19 NOTE — TELEPHONE ENCOUNTER
----- Message from Carey Alatorre NP sent at 6/18/2025 12:49 PM CDT -----  BMP, MRI prostate and then a visit with me. Can be virtual. Thanks    ----- Message -----  From: Lab, Background User  Sent: 6/18/2025  12:45 PM CDT  To: Carey Alatorre NP

## 2025-07-02 ENCOUNTER — RESULTS FOLLOW-UP (OUTPATIENT)
Dept: UROLOGY | Facility: CLINIC | Age: 65
End: 2025-07-02

## 2025-07-02 ENCOUNTER — HOSPITAL ENCOUNTER (OUTPATIENT)
Dept: RADIOLOGY | Facility: HOSPITAL | Age: 65
Discharge: HOME | End: 2025-07-02
Attending: NURSE PRACTITIONER
Payer: MEDICARE

## 2025-07-02 DIAGNOSIS — R97.20 ELEVATED PSA: ICD-10-CM

## 2025-07-02 PROCEDURE — 72197 MRI PELVIS W/O & W/DYE: CPT | Mod: TC

## 2025-07-02 PROCEDURE — 72197 MRI PELVIS W/O & W/DYE: CPT | Mod: 26,,, | Performed by: RADIOLOGY

## 2025-07-02 PROCEDURE — 25500020 PHARM REV CODE 255: Performed by: NURSE PRACTITIONER

## 2025-07-02 PROCEDURE — A9585 GADOBUTROL INJECTION: HCPCS | Performed by: NURSE PRACTITIONER

## 2025-07-02 RX ORDER — GADOBUTROL 604.72 MG/ML
10 INJECTION INTRAVENOUS
Status: COMPLETED | OUTPATIENT
Start: 2025-07-02 | End: 2025-07-02

## 2025-07-02 RX ADMIN — GADOBUTROL 10 ML: 604.72 INJECTION INTRAVENOUS at 01:07

## 2025-07-10 ENCOUNTER — TELEPHONE (OUTPATIENT)
Dept: UROLOGY | Facility: CLINIC | Age: 65
End: 2025-07-10

## 2025-07-10 ENCOUNTER — OFFICE VISIT (OUTPATIENT)
Dept: UROLOGY | Facility: CLINIC | Age: 65
End: 2025-07-10
Payer: MEDICARE

## 2025-07-10 DIAGNOSIS — N40.1 BPH WITH OBSTRUCTION/LOWER URINARY TRACT SYMPTOMS: ICD-10-CM

## 2025-07-10 DIAGNOSIS — R97.20 ELEVATED PSA: Primary | ICD-10-CM

## 2025-07-10 DIAGNOSIS — N40.0 ENLARGED PROSTATE: ICD-10-CM

## 2025-07-10 DIAGNOSIS — N13.8 BPH WITH OBSTRUCTION/LOWER URINARY TRACT SYMPTOMS: ICD-10-CM

## 2025-07-10 NOTE — PROGRESS NOTES
"Subjective:      Jerrell Bay IV is a 65 y.o. male who returns today regarding his elevated PSA.    The patient has a history of elevated/labile PSA.     Prostate MRI in July 2021 demonstrated "PIRADS 2: BPH.  No focal lesions suspicious for clinically significant cancer.."  Prostate biopsy was cancelled as PSA density was within the normal range.      Component      Latest Ref Rng 6/14/2021 1/14/2022 4/5/2022 7/19/2022   PSA Total      0.00 - 4.00 ng/mL       PSA Total      0.00 - 4.00 ng/mL       Prostate Specific Antigen Free      <=1.50 ng/mL       PSA % Free      Not established %       Prostate Specific Antigen      <=4.00 ng/mL   5.7 (H)     PSA Diagnostic      0.00 - 4.00 ng/mL 6.1 (H)  6.5 (H)   6.1 (H)      Component      Latest Ref Rng 11/15/2022 6/19/2023 11/17/2023 11/21/2024   PSA Total      0.00 - 4.00 ng/mL 6.0 (H)  4.2 (H)  5.0 (H)     PSA Total      0.00 - 4.00 ng/mL   5.0 (H)     Prostate Specific Antigen Free      <=1.50 ng/mL 1.23  1.09  1.08     Prostate Specific Antigen Free         1.08     PSA % Free      Not established % 20.50  25.95  21.60     PSA % Free         21.60     Prostate Specific Antigen      <=4.00 ng/mL   4.4 (H)     PSA Diagnostic      0.00 - 4.00 ng/mL    5.5 (H)      Component      Latest Ref Rng 6/18/2025   PSA Total      0.00 - 4.00 ng/mL    PSA Total      0.00 - 4.00 ng/mL    Prostate Specific Antigen Free      <=1.50 ng/mL 1.05    PSA % Free      Not established % 17.05    Prostate Specific Antigen      <=4.00 ng/mL 6.16 (H)    PSA Diagnostic      0.00 - 4.00 ng/mL       MRI 2023- PIRADS 3  Uronav TRUS/bx Dr. Us 3/8/24- benign      Returns again today elevated PSA.   MRI prostate 6/25- PIRADS 2, PSA density 0.11    Voiding well with flomax! Denies adverse SE  Cialis is working well for his ED.     The following portions of the patient's history were reviewed and updated as appropriate: allergies, current medications, past family history, past medical history, " "past social history, past surgical history and problem list.    Review of Systems  Constitutional: no fever or chills  ENT: no nasal congestion or sore throat  Respiratory: no cough or shortness of breath  Cardiovascular: no chest pain or palpitations  Gastrointestinal: no nausea or vomiting, tolerating diet  Genitourinary: as per HPI  Hematologic/Lymphatic: no easy bruising or lymphadenopathy  Musculoskeletal: no arthralgias or myalgias  Neurological: no seizures or tremors  Behavioral/Psych: no auditory or visual hallucinations     Objective:   Vitals: There were no vitals filed for this visit.      Physical Exam   Deferred, VV    Lab Review   Urinalysis demonstrates : no specimen, virtual  Lab Results   Component Value Date    WBC 7.02 11/21/2024    HGB 15.1 11/21/2024    HCT 44.9 11/21/2024    MCV 94 11/21/2024     11/21/2024     Lab Results   Component Value Date    CREATININE 1.06 04/16/2025    CREATININE 1.0 11/21/2024    BUN 24.0 04/16/2025    BUN 20 11/21/2024     Lab Results   Component Value Date    PSA 6.16 (H) 06/18/2025    PSA 4.4 (H) 11/17/2023     Imaging   (all images personally reviewed; agree with report below)  MRI prostate-   "MRI PROSTATE W W/O CONTRAST     CLINICAL HISTORY:  elevated PSA;  Elevated prostate specific antigen (PSA)     Additional history: None provided.     TECHNIQUE:  Multiparametric MRI of the prostate/pelvis performed on a 3T scanner with phase pelvic coil. Multiplanar, multisequence images including high resolution, small field-of-view T2-WI; axial diffusion weighted images with multiple B-values and creation of ADC-maps; and dynamic contrast enhanced T1-weighted images through the prostate were obtained before, during, and after the administration of 10 cc intravenous gadolinium.     COMPARISON:  Multiple prior exams, most recent from 12/28/2023     FINDINGS:  Prostate: 4.9 x 4.1 x 6.1 cm corresponding to a computed volume of 54.3 cc.     Peripheral zone: Diffuse " "mild T2 hypointensity, score 2.     Transitional zone: Benign prostatic hyperplasia without focal suspicious abnormality, score 2.     Neurovascular bundle: Normal.     Seminal vesicles: Normal appearance.     Adjacent Organ Involvement: No evidence for urinary bladder or rectal invasion.     Lymphadenopathy: None.     Other Findings: Postsurgical changes of the left hip.     Impression:     1. BPH with no suspicious nodules.  Previously identified lesion seen on 12/28/2023 is less conspicuous on today's exam.  No other suspicious nodules are visualized.  Overall Assessment: PI-RADS 2 - Low (clinically significant cancer is unlikely to be present)     Number of targets created for potential MR/US fusion biopsy     Peripheral zone: 0     Transition zone: 0"    Assessment:     1. Elevated PSA    2. Enlarged prostate    3. BPH with obstruction/lower urinary tract symptoms      Plan:   Diagnoses and all orders for this visit:    Elevated PSA  -     PSA, Total and Free; Future    Enlarged prostate    BPH with obstruction/lower urinary tract symptoms      Plan:  --PIRADS 2 with normal PSA density  --Discussed bx vs observation  --Repeat PSA in 6 months  --Continue flomax  --Cialis PRN    The patient location is: [place]  The chief complaint leading to consultation is: MRI results    Visit type: audiovisual    Face to Face time with patient: 11 min  20 minutes of total time spent on the encounter, which includes face to face time and non-face to face time preparing to see the patient (eg, review of tests), Obtaining and/or reviewing separately obtained history, Documenting clinical information in the electronic or other health record, Independently interpreting results (not separately reported) and communicating results to the patient/family/caregiver, or Care coordination (not separately reported).     Each patient to whom he or she provides medical services by telemedicine is:  (1) informed of the relationship between the " physician and patient and the respective role of any other health care provider with respect to management of the patient; and (2) notified that he or she may decline to receive medical services by telemedicine and may withdraw from such care at any time.    Notes: see above

## 2025-07-10 NOTE — TELEPHONE ENCOUNTER
----- Message from Carey Alatorre NP sent at 7/10/2025 12:13 PM CDT -----  PSA in 6 months please

## 2025-08-04 ENCOUNTER — PATIENT MESSAGE (OUTPATIENT)
Dept: ADMINISTRATIVE | Facility: HOSPITAL | Age: 65
End: 2025-08-04
Payer: MEDICARE